# Patient Record
Sex: FEMALE | Race: WHITE | NOT HISPANIC OR LATINO | Employment: OTHER | URBAN - METROPOLITAN AREA
[De-identification: names, ages, dates, MRNs, and addresses within clinical notes are randomized per-mention and may not be internally consistent; named-entity substitution may affect disease eponyms.]

---

## 2020-04-02 ENCOUNTER — TELEPHONE (OUTPATIENT)
Dept: GASTROENTEROLOGY | Facility: CLINIC | Age: 70
End: 2020-04-02

## 2020-04-02 ENCOUNTER — TELEMEDICINE (OUTPATIENT)
Dept: GASTROENTEROLOGY | Facility: CLINIC | Age: 70
End: 2020-04-02

## 2020-04-02 VITALS — BODY MASS INDEX: 42.33 KG/M2 | WEIGHT: 230 LBS | HEIGHT: 62 IN

## 2020-04-02 DIAGNOSIS — Z91.19 FAILURE TO ATTEND APPOINTMENT WITHOUT REASON GIVEN: Primary | ICD-10-CM

## 2020-04-02 DIAGNOSIS — K57.32 DIVERTICULITIS LARGE INTESTINE W/O PERFORATION OR ABSCESS W/O BLEEDING: Primary | ICD-10-CM

## 2020-04-03 RX ORDER — AMOXICILLIN AND CLAVULANATE POTASSIUM 875; 125 MG/1; MG/1
1 TABLET, FILM COATED ORAL EVERY 12 HOURS SCHEDULED
Qty: 20 TABLET | Refills: 0 | Status: SHIPPED | OUTPATIENT
Start: 2020-04-03 | End: 2020-04-13

## 2020-04-06 ENCOUNTER — OFFICE VISIT (OUTPATIENT)
Dept: GASTROENTEROLOGY | Facility: CLINIC | Age: 70
End: 2020-04-06
Payer: MEDICARE

## 2020-04-06 VITALS
SYSTOLIC BLOOD PRESSURE: 122 MMHG | BODY MASS INDEX: 43.24 KG/M2 | DIASTOLIC BLOOD PRESSURE: 60 MMHG | WEIGHT: 235 LBS | HEART RATE: 91 BPM | HEIGHT: 62 IN

## 2020-04-06 DIAGNOSIS — Z12.11 SCREENING FOR COLON CANCER: ICD-10-CM

## 2020-04-06 DIAGNOSIS — K57.92 DIVERTICULITIS: Primary | ICD-10-CM

## 2020-04-06 DIAGNOSIS — K62.5 RECTAL BLEED: ICD-10-CM

## 2020-04-06 PROCEDURE — 99204 OFFICE O/P NEW MOD 45 MIN: CPT | Performed by: INTERNAL MEDICINE

## 2021-03-09 DIAGNOSIS — Z23 ENCOUNTER FOR IMMUNIZATION: ICD-10-CM

## 2021-03-17 ENCOUNTER — IMMUNIZATIONS (OUTPATIENT)
Dept: FAMILY MEDICINE CLINIC | Facility: HOSPITAL | Age: 71
End: 2021-03-17

## 2021-03-17 DIAGNOSIS — Z23 ENCOUNTER FOR IMMUNIZATION: Primary | ICD-10-CM

## 2021-03-17 PROCEDURE — 91300 SARS-COV-2 / COVID-19 MRNA VACCINE (PFIZER-BIONTECH) 30 MCG: CPT

## 2021-03-17 PROCEDURE — 0001A SARS-COV-2 / COVID-19 MRNA VACCINE (PFIZER-BIONTECH) 30 MCG: CPT

## 2021-04-10 ENCOUNTER — IMMUNIZATIONS (OUTPATIENT)
Dept: FAMILY MEDICINE CLINIC | Facility: HOSPITAL | Age: 71
End: 2021-04-10

## 2021-04-10 DIAGNOSIS — Z23 ENCOUNTER FOR IMMUNIZATION: Primary | ICD-10-CM

## 2021-04-10 PROCEDURE — 0002A SARS-COV-2 / COVID-19 MRNA VACCINE (PFIZER-BIONTECH) 30 MCG: CPT

## 2021-04-10 PROCEDURE — 91300 SARS-COV-2 / COVID-19 MRNA VACCINE (PFIZER-BIONTECH) 30 MCG: CPT

## 2023-06-14 ENCOUNTER — EVALUATION (OUTPATIENT)
Dept: PHYSICAL THERAPY | Facility: CLINIC | Age: 73
End: 2023-06-14
Payer: MEDICARE

## 2023-06-14 DIAGNOSIS — M17.0 BILATERAL PRIMARY OSTEOARTHRITIS OF KNEE: Primary | ICD-10-CM

## 2023-06-14 PROCEDURE — 97110 THERAPEUTIC EXERCISES: CPT | Performed by: PHYSICAL THERAPIST

## 2023-06-14 PROCEDURE — 97162 PT EVAL MOD COMPLEX 30 MIN: CPT | Performed by: PHYSICAL THERAPIST

## 2023-06-14 RX ORDER — MELOXICAM 15 MG/1
15 TABLET ORAL DAILY
COMMUNITY

## 2023-06-14 RX ORDER — PRAVASTATIN SODIUM 20 MG
20 TABLET ORAL DAILY
COMMUNITY

## 2023-06-14 NOTE — LETTER
2023    Maurice Lara DO  [address]  [address]    Patient: Jaziel Thurston   YOB: 1950   Date of Visit: 2023     Encounter Diagnosis     ICD-10-CM    1  Bilateral primary osteoarthritis of knee  M17 0           Dear Dr Michaelle Medellin: Thank you for your recent referral of Jaziel Thurston  Please review the attached evaluation summary from Charis's recent visit  Please verify that you agree with the plan of care by signing the attached order  If you have any questions or concerns, please do not hesitate to call  I sincerely appreciate the opportunity to share in the care of one of your patients and hope to have another opportunity to work with you in the near future  Sincerely,    Yaneli Lieberman, PT      Referring Provider:      I certify that I have read the below Plan of Care and certify the need for these services furnished under this plan of treatment while under my care  Maurice Lara DO  [address]  [address]  [address] [address]  Via Fax: [phone]          PT Evaluation     Today's date: 2023  Patient name: Jaziel Thurston  : 1950  MRN: [id number]  Referring provider: Frannie Balderrama DO  Dx:   Encounter Diagnosis     ICD-10-CM    1  Bilateral primary osteoarthritis of knee  M17 0                      Assessment  Assessment details: Jaziel Thurston is a 67 y o  female presenting to outpatient physical therapy with chief complaints of (B) knee pain  Patient describes chronic knee pain for many years that has gradually progressed - injections recently were not effective  Patient displays with abnormal gait, good knee ROM, good knee strength, (B) hip strength deficits, (-) ligamentous instability, and functional restrictions    Patient's symptoms are multifactorial in nature with a primary movement diagnosis of poor hip motor control resulting in pathoanatomical signs and symptoms consistent with Bilateral primary osteoarthritis of knee  (primary encounter diagnosis) resulting in limitations in her ability to walk and exercise as desired  No further referral appears necessary at this time based upon examination results - patient consulting with orthopedic surgeon on 6/27/23  Please contact me if you have any questions ([phone])  Thank you for the opportunity to share in the care of this patient  Impairments: abnormal gait, activity intolerance, impaired balance, impaired physical strength, lacks appropriate home exercise program and pain with function    Symptom irritability: moderateUnderstanding of Dx/Px/POC: good   Prognosis: fair  Prognosis details: Positive prognostic indicators include positive attitude toward recovery, motivated to improve, good understanding of condition, realistic expectations  Negative prognostic indicators include chronicity of symptoms  Goals  STG to be met in 3 weeks:  - Increase (B) Hip strength by 1/2 MMT grade  - Improve SL balance to 15 seconds  - I with HEP   - Patient will be able to walk short distances without an AD and without pain  LTG to be met in 6 weeks:  - Increase (B) Hip strength to 4+/5 all planes  - Improve SL balance to 30 seconds  - I with updated HEP   - Patient will be able to return to ascending and descending steps without pain or difficulty  Plan  Plan details: Prognosis above is given PT services 2x/week tapering to 1x/week over the next 6 weeks and home program adherence       Patient would benefit from: skilled physical therapy  Planned modality interventions: cryotherapy and thermotherapy: hydrocollator packs  Planned therapy interventions: joint mobilization, manual therapy, neuromuscular re-education, patient education, strengthening, stretching, therapeutic activities, therapeutic exercise, home exercise program, body mechanics training, activity modification, functional ROM exercises, gait training and balance  Plan of Care beginning date: 2023  Plan of Care expiration date: 2023  Treatment plan discussed with: patient        Subjective Evaluation    History of Present Illness  Mechanism of injury: At Evaluation (2023): Patient reports chronic (B) knee pain for over 30 years  She has managed her symptoms over the years with activity modification, injections, and OTC medication  She reports the last time she had cortisone injections they were not as helpful  She saw the orthopedic physician recently - recommended consulting with a surgeon  Red Flag Screen:  Patient denies recent fever, changes in bowel and bladder function, nausea and vomiting, weakness, unexplained weight loss, tingling, numbness, pain with coughing, or traumatic SYLWIA       Greatest concern: walking well after surgery    Quality of life: good    Pain  Current pain ratin  At best pain ratin  At worst pain ratin  Location: (B) Knees  Quality: dull ache    Social Support  Steps to enter house: yes  Stairs in house: yes   Lives in: multiple-level home  Lives with: spouse    Employment status: not working    Diagnostic Tests  X-ray: abnormal  Patient Goals  Patient goal: Patient Specific Functional Scale: walk without support and no increased pain 0/10, perform stair negotiation without increased pain 0/10, prepare for surgery if needed 0/10; Total 0/30        Objective     Static Posture   General Observations  Symmetrical weight bearing       Palpation     Additional Palpation Details  No TTP throughout (B) knees     Active Range of Motion   Left Knee   Flexion: 130 degrees   Extension: 0 degrees     Right Knee   Flexion: 130 degrees   Extension: 0 degrees     Additional Active Range of Motion Details  Increased pain with (B) flexion OP     Strength/Myotome Testing     Left Hip   Planes of Motion   Extension: 3+  Abduction: 3+    Right Hip   Planes of Motion   Extension: 3+  Abduction: 3+    Left Knee   Flexion: 4+  Extension: 4+    Right Knee Flexion: 4+  Extension: 4+    Tests     Left Knee   Negative anterior drawer, posterior drawer, valgus stress test at 0 degrees, valgus stress test at 30 degrees, varus stress test at 0 degrees and varus stress test at 30 degrees  Right Knee   Negative anterior drawer, posterior drawer, valgus stress test at 0 degrees, valgus stress test at 30 degrees, varus stress test at 0 degrees and varus stress test at 30 degrees  Ambulation     Observational Gait   Gait: antalgic   Decreased walking speed, left step length and right step length  Daily Treatment Diary     DX: (B) Knee OA  EPOC: 7/26/23  Follow Up with Referring Provider:   Precautions: NA  CO-MORBIDITIES: Hx of AAA  HEP ACCESS CODE: [id number]   Survey Monkey Given (______)    Stage: chronic  Stability of Symptoms:  At Evaluation: stable - unchanged  Global Rating of Change:   Symptom Irritability Level: moderate  Primary Movement Diagnosis: poor hip motor control   Patient Specific Functional Scale:   6/14/23: walk without support and no increased pain 0/10, perform stair negotiation without increased pain 0/10, prepare for surgery if needed 0/10;  Total 0/30  FOTO Prediction: 66 by visit 11  FOTO Progress: 60 at evaluation   Greatest Concern: walking well after surgery  Current Activity Plan: added to HEP (6/13)  Current Educational Needs: progressions       Treatment Diary          Manual Therapy                                                                        Therapeutic Exercise  HEP         Recumbent Bike                    SLR 6/13         S/L Hip ABD 6/13         Bridge 6/13                                                                     Neuromuscular Reeducation          TB Counter Balance                    TB Side Stepping          TB W Stepping                    FWD Lunge          LAT Lunge          Mini Squat                    FWD Step Up          LAT Step Up          Therapeutic Activity Gait Training                                        Modalities

## 2023-06-14 NOTE — PROGRESS NOTES
PT Evaluation     Today's date: 2023  Patient name: Abdoulaye Terrell  : 1950  MRN: [id number]  Referring provider: Maykel Larry DO  Dx:   Encounter Diagnosis     ICD-10-CM    1  Bilateral primary osteoarthritis of knee  M17 0                      Assessment  Assessment details: Abdoulaye Terrell is a 67 y o  female presenting to outpatient physical therapy with chief complaints of (B) knee pain  Patient describes chronic knee pain for many years that has gradually progressed - injections recently were not effective  Patient displays with abnormal gait, good knee ROM, good knee strength, (B) hip strength deficits, (-) ligamentous instability, and functional restrictions  Patient's symptoms are multifactorial in nature with a primary movement diagnosis of poor hip motor control resulting in pathoanatomical signs and symptoms consistent with Bilateral primary osteoarthritis of knee  (primary encounter diagnosis) resulting in limitations in her ability to walk and exercise as desired  No further referral appears necessary at this time based upon examination results - patient consulting with orthopedic surgeon on 23  Please contact me if you have any questions ([phone])  Thank you for the opportunity to share in the care of this patient  Impairments: abnormal gait, activity intolerance, impaired balance, impaired physical strength, lacks appropriate home exercise program and pain with function    Symptom irritability: moderateUnderstanding of Dx/Px/POC: good   Prognosis: fair  Prognosis details: Positive prognostic indicators include positive attitude toward recovery, motivated to improve, good understanding of condition, realistic expectations  Negative prognostic indicators include chronicity of symptoms  Goals  STG to be met in 3 weeks:  - Increase (B) Hip strength by 1/2 MMT grade  - Improve SL balance to 15 seconds    - I with HEP   - Patient will be able to walk short distances without an AD and without pain  LTG to be met in 6 weeks:  - Increase (B) Hip strength to 4+/5 all planes  - Improve SL balance to 30 seconds  - I with updated HEP   - Patient will be able to return to ascending and descending steps without pain or difficulty  Plan  Plan details: Prognosis above is given PT services 2x/week tapering to 1x/week over the next 6 weeks and home program adherence  Patient would benefit from: skilled physical therapy  Planned modality interventions: cryotherapy and thermotherapy: hydrocollator packs  Planned therapy interventions: joint mobilization, manual therapy, neuromuscular re-education, patient education, strengthening, stretching, therapeutic activities, therapeutic exercise, home exercise program, body mechanics training, activity modification, functional ROM exercises, gait training and balance  Plan of Care beginning date: 2023  Plan of Care expiration date: 2023  Treatment plan discussed with: patient        Subjective Evaluation    History of Present Illness  Mechanism of injury: At Evaluation (2023): Patient reports chronic (B) knee pain for over 30 years  She has managed her symptoms over the years with activity modification, injections, and OTC medication  She reports the last time she had cortisone injections they were not as helpful  She saw the orthopedic physician recently - recommended consulting with a surgeon        Red Flag Screen:  Patient denies recent fever, changes in bowel and bladder function, nausea and vomiting, weakness, unexplained weight loss, tingling, numbness, pain with coughing, or traumatic SYLWIA       Greatest concern: walking well after surgery    Quality of life: good    Pain  Current pain ratin  At best pain ratin  At worst pain ratin  Location: (B) Knees  Quality: dull ache    Social Support  Steps to enter house: yes  Stairs in house: yes   Lives in: multiple-level home  Lives with: spouse    Employment status: not working    Diagnostic Tests  X-ray: abnormal  Patient Goals  Patient goal: Patient Specific Functional Scale: walk without support and no increased pain 0/10, perform stair negotiation without increased pain 0/10, prepare for surgery if needed 0/10; Total 0/30        Objective     Static Posture   General Observations  Symmetrical weight bearing  Palpation     Additional Palpation Details  No TTP throughout (B) knees     Active Range of Motion   Left Knee   Flexion: 130 degrees   Extension: 0 degrees     Right Knee   Flexion: 130 degrees   Extension: 0 degrees     Additional Active Range of Motion Details  Increased pain with (B) flexion OP     Strength/Myotome Testing     Left Hip   Planes of Motion   Extension: 3+  Abduction: 3+    Right Hip   Planes of Motion   Extension: 3+  Abduction: 3+    Left Knee   Flexion: 4+  Extension: 4+    Right Knee   Flexion: 4+  Extension: 4+    Tests     Left Knee   Negative anterior drawer, posterior drawer, valgus stress test at 0 degrees, valgus stress test at 30 degrees, varus stress test at 0 degrees and varus stress test at 30 degrees  Right Knee   Negative anterior drawer, posterior drawer, valgus stress test at 0 degrees, valgus stress test at 30 degrees, varus stress test at 0 degrees and varus stress test at 30 degrees  Ambulation     Observational Gait   Gait: antalgic   Decreased walking speed, left step length and right step length                Daily Treatment Diary     DX: (B) Knee OA  EPOC: 7/26/23  Follow Up with Referring Provider:   Precautions: NA  CO-MORBIDITIES: Hx of AAA  HEP ACCESS CODE: [id number]   Survey Monkey Given (______)    Stage: chronic  Stability of Symptoms:  At Evaluation: stable - unchanged  Global Rating of Change:   Symptom Irritability Level: moderate  Primary Movement Diagnosis: poor hip motor control   Patient Specific Functional Scale:   6/14/23: walk without support and no increased pain 0/10, perform stair negotiation without increased pain 0/10, prepare for surgery if needed 0/10;  Total 0/30  FOTO Prediction: 66 by visit 11  FOTO Progress: 60 at evaluation   Greatest Concern: walking well after surgery  Current Activity Plan: added to HEP (6/13)  Current Educational Needs: progressions       Treatment Diary          Manual Therapy                                                                        Therapeutic Exercise  HEP         Recumbent Bike                    SLR 6/13         S/L Hip ABD 6/13         Bridge 6/13                                                                     Neuromuscular Reeducation          TB Counter Balance                    TB Side Stepping          TB W Stepping                    FWD Lunge          LAT Lunge          Mini Squat                    FWD Step Up          LAT Step Up          Therapeutic Activity                              Gait Training                                        Modalities

## 2023-06-22 ENCOUNTER — OFFICE VISIT (OUTPATIENT)
Dept: PHYSICAL THERAPY | Facility: CLINIC | Age: 73
End: 2023-06-22
Payer: MEDICARE

## 2023-06-22 DIAGNOSIS — M17.0 BILATERAL PRIMARY OSTEOARTHRITIS OF KNEE: Primary | ICD-10-CM

## 2023-06-22 PROCEDURE — 97110 THERAPEUTIC EXERCISES: CPT | Performed by: PHYSICAL THERAPIST

## 2023-06-22 PROCEDURE — 97112 NEUROMUSCULAR REEDUCATION: CPT | Performed by: PHYSICAL THERAPIST

## 2023-06-22 NOTE — PROGRESS NOTES
Daily Note     Today's date: 2023  Patient name: Damien Plummer  : 1950  MRN: [id number]  Referring provider: Gideon Valverde DO  Dx:   Encounter Diagnosis     ICD-10-CM    1  Bilateral primary osteoarthritis of knee  M17 0                      Subjective: Patient reports good tolerance to her LV  She saw an orthopedic surgeon - scheduled for surgery in October  She notes HEP is going well - challenging to do 2 sets of 10  Objective: See treatment diary below      Assessment:   Stage: chronic  Stability of Symptoms:  At Evaluation: stable - unchanged  Global Rating of Change:   Symptom Irritability Level: moderate  Primary Movement Diagnosis: poor hip motor control   Patient Specific Functional Scale:   23: walk without support and no increased pain 0/10, perform stair negotiation without increased pain 0/10, prepare for surgery if needed 0/10; Total 0/30  FOTO Prediction: 66 by visit 11  FOTO Progress: 60 at evaluation   Greatest Concern: walking well after surgery  Current Activity Plan: added to HEP ()  Current Educational Needs: progressions     Patient had good tolerance to previously issued HEP - discussed strategies to avoid cramping in hamstring  She was challenged with standing exercises - no complaints of increased pain  Skilled PT continues to be required to guide progression of hip strength and control to allow her to walk with better support and less pain and prepare for surgery in October          Plan: Continue with POC - monitor tolerance to treatment - progress as able NV       Daily Treatment Diary     DX: (B) Knee OA  EPOC: 23  Follow Up with Referring Provider:   Precautions: NA  CO-MORBIDITIES: Hx of AAA  HEP ACCESS CODE: [id number]   Survey Monkey Given (______)    Treatment Diary          Manual Therapy                                                                        Therapeutic Exercise  HEP         Recumbent Bike  6 min                  SLR  2x10 "ea        S/L Hip ABD 6/13 2x10 ea        Bridge 6/13 5\"x20                                                                    Neuromuscular Reeducation          TB Counter Balance  GTB  10x ea                  TB Side Stepping          TB W Stepping                    FWD Lunge  10x ea        LAT Lunge  10x ea        Mini Squat  10x                  FWD Step Up  8\" Step  10x ea        LAT Step Up          Therapeutic Activity                              Gait Training                                        Modalities                                        "

## 2023-06-26 ENCOUNTER — APPOINTMENT (OUTPATIENT)
Dept: PHYSICAL THERAPY | Facility: CLINIC | Age: 73
End: 2023-06-26
Payer: MEDICARE

## 2023-06-29 ENCOUNTER — APPOINTMENT (OUTPATIENT)
Dept: PHYSICAL THERAPY | Facility: CLINIC | Age: 73
End: 2023-06-29
Payer: MEDICARE

## 2023-06-29 ENCOUNTER — OFFICE VISIT (OUTPATIENT)
Dept: PHYSICAL THERAPY | Facility: CLINIC | Age: 73
End: 2023-06-29
Payer: MEDICARE

## 2023-06-29 DIAGNOSIS — M17.0 BILATERAL PRIMARY OSTEOARTHRITIS OF KNEE: Primary | ICD-10-CM

## 2023-06-29 PROCEDURE — 97112 NEUROMUSCULAR REEDUCATION: CPT | Performed by: PHYSICAL THERAPIST

## 2023-06-29 PROCEDURE — 97110 THERAPEUTIC EXERCISES: CPT | Performed by: PHYSICAL THERAPIST

## 2023-06-29 NOTE — PROGRESS NOTES
Daily Note     Today's date: 2023  Patient name: True Vogel  : 1950  MRN: [id number]  Referring provider: Cami Rodríguez DO  Dx:   Encounter Diagnosis     ICD-10-CM    1  Bilateral primary osteoarthritis of knee  M17 0                      Subjective: Patient reports she had some chest pain last week - was seen in the ER - cleared for MI  She has been taking it easy with her HEP  Objective: See treatment diary below      Assessment:   Stage: chronic  Stability of Symptoms:  At Evaluation: stable - unchanged  Global Rating of Change:   Symptom Irritability Level: moderate  Primary Movement Diagnosis: poor hip motor control   Patient Specific Functional Scale:   23: walk without support and no increased pain 0/10, perform stair negotiation without increased pain 0/10, prepare for surgery if needed 0/10; Total 030  FOTO Prediction: 66 by visit 11  FOTO Progress: 60 at evaluation   Greatest Concern: walking well after surgery  Current Activity Plan: added to HEP ()  Current Educational Needs: progressions     Patient had good tolerance to exercise treatment  Progressions were not made secondary to recent ER visit  She had no reproduction of pain or chest tightness with exercise  Skilled PT continues to be required to guide progression of hip strength and control to allow her to walk with better support and less pain and prepare for surgery in October          Plan: Continue with POC - monitor tolerance to treatment - progress as able NV       Daily Treatment Diary     DX: (B) Knee OA  EPOC: 23  Follow Up with Referring Provider:   Precautions: NA  CO-MORBIDITIES: Hx of AAA  HEP ACCESS CODE: [id number]   Survey Monkey Given (______)    Treatment Diary         Manual Therapy                                                                        Therapeutic Exercise  HEP         Recumbent Bike  6 min 6 min                 SLR  2x10 ea 20x ea       S/L Hip ABD  2x10 ea "2x10 ea       Bridge 6/13 5\"x20 5\"x20                                                                   Neuromuscular Reeducation          TB Counter Balance  GTB  10x ea GTB  10x ea                 TB Side Stepping          TB W Stepping                    FWD Lunge  10x ea 10x ea       LAT Lunge  10x ea 10x ea       Mini Squat  10x 10x                 FWD Step Up  8\" Step  10x ea 8\" Step  10x ea       LAT Step Up          Therapeutic Activity                              Gait Training                                        Modalities                                        "

## 2023-07-06 ENCOUNTER — APPOINTMENT (OUTPATIENT)
Dept: PHYSICAL THERAPY | Facility: CLINIC | Age: 73
End: 2023-07-06
Payer: MEDICARE

## 2023-07-13 ENCOUNTER — OFFICE VISIT (OUTPATIENT)
Dept: PHYSICAL THERAPY | Facility: CLINIC | Age: 73
End: 2023-07-13
Payer: MEDICARE

## 2023-07-13 DIAGNOSIS — M17.0 BILATERAL PRIMARY OSTEOARTHRITIS OF KNEE: Primary | ICD-10-CM

## 2023-07-13 PROCEDURE — 97110 THERAPEUTIC EXERCISES: CPT | Performed by: PHYSICAL THERAPIST

## 2023-07-13 PROCEDURE — 97112 NEUROMUSCULAR REEDUCATION: CPT | Performed by: PHYSICAL THERAPIST

## 2023-07-13 NOTE — PROGRESS NOTES
Daily Note     Today's date: 2023  Patient name: Meli Naylor  : 1950  MRN: [id number]  Referring provider: Russell Patel DO  Dx:   Encounter Diagnosis     ICD-10-CM    1. Bilateral primary osteoarthritis of knee  M17.0                      Subjective: Patient reports she is feeling better overall - feels confident in her ability to continue progressing on her own. Objective: See treatment diary below      Assessment:   Stage: chronic  Stability of Symptoms:  At Evaluation: stable - unchanged  Global Rating of Change:   Symptom Irritability Level: moderate  Primary Movement Diagnosis: poor hip motor control   Patient Specific Functional Scale:   23: walk without support and no increased pain 0/10, perform stair negotiation without increased pain 0/10, prepare for surgery if needed 0/10; Total 030  FOTO Prediction: 66 by visit 11  FOTO Progress: 60 at evaluation   Greatest Concern: walking well after surgery  Current Activity Plan: added to HEP ()  Current Educational Needs: progressions     Patient had good exercise form and recall. She feels comfortable progressing her exercises independently to prepare for surgery. At this time it is recommended that she continue with an I HEP. She is to contact me if she has return of symptoms or any questions/ concerns.           Plan: DC to I HEP       Daily Treatment Diary     DX: (B) Knee OA  EPOC: 23  Follow Up with Referring Provider:   Precautions: NA  CO-MORBIDITIES: Hx of AAA  HEP ACCESS CODE: [id number]   Survey Monkey Given (______)    Treatment Diary        Manual Therapy                                                                        Therapeutic Exercise  HEP         Recumbent Bike  6 min 6 min 6 min                SLR  2x10 ea 20x ea 20x ea      S/L Hip ABD  2x10 ea 2x10 ea 20x ea      Bridge  5"x20 5"x20 5"x20                                                                  Neuromuscular Reeducation          TB Counter Balance  GTB  10x ea GTB  10x ea                 TB Side Stepping          TB W Stepping                    FWD Lunge  10x ea 10x ea 15x ea      LAT Lunge  10x ea 10x ea 15x ea      Mini Squat  10x 10x 15x                FWD Step Up  8" Step  10x ea 8" Step  10x ea 8" Step  10x ea      LAT Step Up          Therapeutic Activity                              Gait Training                                        Modalities

## 2023-10-26 ENCOUNTER — EVALUATION (OUTPATIENT)
Dept: PHYSICAL THERAPY | Facility: CLINIC | Age: 73
End: 2023-10-26
Payer: MEDICARE

## 2023-10-26 DIAGNOSIS — Z96.651 S/P TKR (TOTAL KNEE REPLACEMENT), RIGHT: Primary | ICD-10-CM

## 2023-10-26 PROCEDURE — 97110 THERAPEUTIC EXERCISES: CPT | Performed by: PHYSICAL THERAPIST

## 2023-10-26 PROCEDURE — 97162 PT EVAL MOD COMPLEX 30 MIN: CPT | Performed by: PHYSICAL THERAPIST

## 2023-10-26 RX ORDER — HYDROCODONE BITARTRATE AND ACETAMINOPHEN 5; 325 MG/1; MG/1
1 TABLET ORAL EVERY 6 HOURS PRN
COMMUNITY

## 2023-10-26 RX ORDER — PANTOPRAZOLE SODIUM 40 MG/1
40 TABLET, DELAYED RELEASE ORAL DAILY
COMMUNITY

## 2023-10-26 RX ORDER — OXYCODONE HYDROCHLORIDE 5 MG/1
5 CAPSULE ORAL EVERY 4 HOURS PRN
COMMUNITY

## 2023-10-26 NOTE — PROGRESS NOTES
PT Evaluation     Today's date: 10/26/2023  Patient name: Hayley Arellano  : 1950  MRN: [id number]  Referring provider: Jacqueline Villafuerte DO  Dx:   Encounter Diagnosis     ICD-10-CM    1. S/P TKR (total knee replacement), right  Z96.651                      Assessment  Assessment details: Hayley Arellano is a 68 y.o. female presenting to outpatient physical therapy s/p (R) TKA. Patient describes chronic (B) knee pain leading up to (R) TKA. Patient displays with abnormal gait, abnormal posture, restricted (R) knee AROM, (B) hip and (R) knee strength deficits, balance impairments, and functional restrictions. Patient's symptoms are multifactorial in nature with a primary movement diagnosis of (R) knee hypomobility resulting in pathoanatomical signs and symptoms consistent with S/P TKR (total knee replacement), right  (primary encounter diagnosis) resulting in limitations in her ability to perform all housework without pain. No further referral appears necessary at this time based upon examination results. Please contact me if you have any questions ([phone]). Thank you for the opportunity to share in the care of this patient. Impairments: abnormal gait, abnormal or restricted ROM, activity intolerance, impaired balance, impaired physical strength, lacks appropriate home exercise program and pain with function    Symptom irritability: moderateUnderstanding of Dx/Px/POC: good   Prognosis: good  Prognosis details: Positive prognostic indicators include positive attitude toward recovery, motivated to improve, high self-efficacy, good understanding of condition, realistic expectations. Negative prognostic indicators include chronicity of symptoms. Goals  STG to be met in 5 weeks:  - Increase (R) Knee AROM: 0-105 degrees. - Increase (B) Hip and (R) Knee strength by 1/2 MMT grade. - Improve SL balance to 10 seconds. - I with HEP.  - Patient will be able to return to walking without AD.    LTG to be met in 10 weeks:  - Increase (R) Knee AROM: 0-120 degrees. - Increase (B) Hip and (R) Knee strength to 4+/5 all planes. - Improve SL balance to 20 seconds. - I with updated HEP.  - Patient will be able to return to performing all housework without increased pain. Plan  Plan details: Prognosis above is given PT services 2x/week tapering to 1x/week over the next 10 weeks and home program adherence. Patient would benefit from: skilled physical therapy  Planned modality interventions: cryotherapy and thermotherapy: hydrocollator packs  Planned therapy interventions: joint mobilization, manual therapy, neuromuscular re-education, patient education, strengthening, stretching, therapeutic activities, therapeutic exercise, home exercise program, body mechanics training, activity modification, functional ROM exercises, gait training and balance  Plan of Care beginning date: 10/26/2023  Plan of Care expiration date: 2024  Treatment plan discussed with: patient        Subjective Evaluation    History of Present Illness  Date of surgery: 10/23/2023  Mechanism of injury: surgery  Mechanism of injury: At Evaluation (2023): Patient reports chronic (B) knee pain for many years. She underwent (R) TKA on 10/23/23 and was discharged to home the same day. Red Flag Screen:  Patient denies recent fever, changes in bowel and bladder function, nausea and vomiting, weakness, unexplained weight loss, tingling, numbness, pain with coughing, or traumatic SYLWIA. Laura Schooner' Criteria for DVT Score = +1. Greatest concern: Swelling     Quality of life: good    Patient Goals  Patient goal: Patient Specific Functional Scale: return to walking without AD 0/10, perform stair negotiation without pain 0/10, return to performing all housework without pain 0/10;  Total 0/30  Pain  Current pain ratin  At best pain rating: 3  At worst pain rating: 10  Location: (R) Knee  Quality: dull ache and pressure  Alleviating factors: Active modification, gentle movement, medication. Exacerbated by: Standing, thigh pain with elevation, end range flexion, end range extension. Social Support  Steps to enter house: yes  Stairs in house: yes   Lives in: multiple-level home  Lives with: spouse    Employment status: not working    Diagnostic Tests  X-ray: abnormal  Treatments  Previous treatment: physical therapy        Objective     Static Posture   General Observations  Asymmetrical weight bearing and shifted left. Knee   Knee (Right): Flexed. Observations     Additional Observation Details  No evidence of infection    Palpation     Additional Palpation Details  TTP throughout (R) knee     Active Range of Motion     Right Knee   Flexion: 60 degrees   Extension: -5 degrees     Strength/Myotome Testing     Left Hip   Planes of Motion   Extension: 5  Abduction: 4+    Right Hip   Planes of Motion   Extension: 3+  Abduction: 3+    Left Knee   Flexion: 4  Extension: 3    Right Knee   Flexion: 5  Extension: 4+    Additional Strength Details  Hip ABD MMT performed in hooklying  Hip Ext MMT performed in supine    Tests     Additional Tests Details  Additional special testing not performed secondary to post op status        Functional Assessment        Comments  TU.6 seconds             Daily Treatment Diary     DX: (R) TKA  EPOC: 24  Follow Up with Referring Provider:   Precautions: NA  CO-MORBIDITIES: Hx of AAA  HEP ACCESS CODE: [id number]      Stage: acute on chronic  Stability of Symptoms:  At Evaluation: evolving - worsening  Global Rating of Change:   Symptom Irritability Level: high  Primary Movement Diagnosis: knee hypomobility  Primary Pain Phenotype: nociceptive   Patient Specific Functional Scale:   10/26/23: return to walking without AD 0/10, perform stair negotiation without pain 0/10, return to performing all housework without pain 0/10;  Total 0/30   FOTO Prediction: 58 by visit 18  FOTO Progress: 31 at evaluation   Greatest Concern: swelling  Current Activity Plan: added to HEP (10/26)  Current Educational Needs: progressions      Treatment Diary          Manual Therapy         (R) Knee PROM                                                               Therapeutic Exercise  HEP         Recumbent Bike          Ankle Pump 10/26         Supine Quad Set 10/26         Supine Heel Slide          Seated Quad Set 10/26         Seated Heel Slide 10/26         Long Sit Calf Stretch 10/26                             SLR          H/L TB Hip ABD          Bridge                    LAQ                    Neuromuscular Reeducation          SL Balance                    Standing Hip Flex          Standing Hip ABD          Standing Hip Ext          Standing HS Curl          TB TKE                    FWD Wt Shift          LAT Wt Shift          Mini Squat                    FWD Step Up          LAT Step Up                    Therapeutic Activity                              Gait Training                                        Modalities

## 2023-10-26 NOTE — LETTER
2023    Yaneth Downey DO  [address]    Patient: Boogie Brown   YOB: 1950   Date of Visit: 10/26/2023     Encounter Diagnosis     ICD-10-CM    1. S/P TKR (total knee replacement), right  Z96.651           Dear Dr. Luigi Kevin: Thank you for your recent referral of Boogie Brown. Please review the attached evaluation summary from Charis's recent visit. Please verify that you agree with the plan of care by signing the attached order. If you have any questions or concerns, please do not hesitate to call. I sincerely appreciate the opportunity to share in the care of one of your patients and hope to have another opportunity to work with you in the near future. Sincerely,    Mikayla Gama, PT      Referring Provider:      I certify that I have read the below Plan of Care and certify the need for these services furnished under this plan of treatment while under my care. Yaneth Downey DO  [address] [place] [id number]  Via Fax: [phone]          PT Evaluation     Today's date: 10/26/2023  Patient name: Boogie Brown  : 1950  MRN: [id number]  Referring provider: Yaneth Downey DO  Dx:   Encounter Diagnosis     ICD-10-CM    1. S/P TKR (total knee replacement), right  Z96.651                      Assessment  Assessment details: Boogie Brown is a 68 y.o. female presenting to outpatient physical therapy s/p (R) TKA. Patient describes chronic (B) knee pain leading up to (R) TKA. Patient displays with abnormal gait, abnormal posture, restricted (R) knee AROM, (B) hip and (R) knee strength deficits, balance impairments, and functional restrictions.   Patient's symptoms are multifactorial in nature with a primary movement diagnosis of (R) knee hypomobility resulting in pathoanatomical signs and symptoms consistent with S/P TKR (total knee replacement), right  (primary encounter diagnosis) resulting in limitations in her ability to perform all housework without pain. No further referral appears necessary at this time based upon examination results. Please contact me if you have any questions ([phone]). Thank you for the opportunity to share in the care of this patient. Impairments: abnormal gait, abnormal or restricted ROM, activity intolerance, impaired balance, impaired physical strength, lacks appropriate home exercise program and pain with function    Symptom irritability: moderateUnderstanding of Dx/Px/POC: good   Prognosis: good  Prognosis details: Positive prognostic indicators include positive attitude toward recovery, motivated to improve, high self-efficacy, good understanding of condition, realistic expectations. Negative prognostic indicators include chronicity of symptoms. Goals  STG to be met in 5 weeks:  - Increase (R) Knee AROM: 0-105 degrees. - Increase (B) Hip and (R) Knee strength by 1/2 MMT grade. - Improve SL balance to 10 seconds. - I with HEP.  - Patient will be able to return to walking without AD. LTG to be met in 10 weeks:  - Increase (R) Knee AROM: 0-120 degrees. - Increase (B) Hip and (R) Knee strength to 4+/5 all planes. - Improve SL balance to 20 seconds. - I with updated HEP.  - Patient will be able to return to performing all housework without increased pain. Plan  Plan details: Prognosis above is given PT services 2x/week tapering to 1x/week over the next 10 weeks and home program adherence.      Patient would benefit from: skilled physical therapy  Planned modality interventions: cryotherapy and thermotherapy: hydrocollator packs  Planned therapy interventions: joint mobilization, manual therapy, neuromuscular re-education, patient education, strengthening, stretching, therapeutic activities, therapeutic exercise, home exercise program, body mechanics training, activity modification, functional ROM exercises, gait training and balance  Plan of Care beginning date: 10/26/2023  Plan of Care expiration date: 2024  Treatment plan discussed with: patient        Subjective Evaluation    History of Present Illness  Date of surgery: 10/23/2023  Mechanism of injury: surgery  Mechanism of injury: At Evaluation (2023): Patient reports chronic (B) knee pain for many years. She underwent (R) TKA on 10/23/23 and was discharged to home the same day. Red Flag Screen:  Patient denies recent fever, changes in bowel and bladder function, nausea and vomiting, weakness, unexplained weight loss, tingling, numbness, pain with coughing, or traumatic SYLWIA. Cristina Hester' Criteria for DVT Score = +1. Greatest concern: Swelling     Quality of life: good    Patient Goals  Patient goal: Patient Specific Functional Scale: return to walking without AD 0/10, perform stair negotiation without pain 0/10, return to performing all housework without pain 0/10; Total 0/30  Pain  Current pain ratin  At best pain rating: 3  At worst pain rating: 10  Location: (R) Knee  Quality: dull ache and pressure  Alleviating factors: Active modification, gentle movement, medication. Exacerbated by: Standing, thigh pain with elevation, end range flexion, end range extension. Social Support  Steps to enter house: yes  Stairs in house: yes   Lives in: multiple-level home  Lives with: spouse    Employment status: not working    Diagnostic Tests  X-ray: abnormal  Treatments  Previous treatment: physical therapy        Objective     Static Posture   General Observations  Asymmetrical weight bearing and shifted left. Knee   Knee (Right): Flexed.      Observations     Additional Observation Details  No evidence of infection    Palpation     Additional Palpation Details  TTP throughout (R) knee     Active Range of Motion     Right Knee   Flexion: 60 degrees   Extension: -5 degrees     Strength/Myotome Testing     Left Hip   Planes of Motion   Extension: 5  Abduction: 4+    Right Hip   Planes of Motion   Extension: 3+  Abduction: 3+    Left Knee   Flexion: 4  Extension: 3    Right Knee   Flexion: 5  Extension: 4+    Additional Strength Details  Hip ABD MMT performed in hooklying  Hip Ext MMT performed in supine    Tests     Additional Tests Details  Additional special testing not performed secondary to post op status        Functional Assessment        Comments  TU.6 seconds             Daily Treatment Diary     DX: (R) TKA  EPOC: 24  Follow Up with Referring Provider:   Precautions: NA  CO-MORBIDITIES: Hx of AAA  HEP ACCESS CODE: [id number]      Stage: acute on chronic  Stability of Symptoms:  At Evaluation: evolving - worsening  Global Rating of Change:   Symptom Irritability Level: high  Primary Movement Diagnosis: knee hypomobility  Primary Pain Phenotype: nociceptive   Patient Specific Functional Scale:   10/26/23: return to walking without AD 0/10, perform stair negotiation without pain 0/10, return to performing all housework without pain 0/10;  Total 0/30   FOTO Prediction: 58 by visit 18  FOTO Progress: 31 at evaluation   Greatest Concern: swelling  Current Activity Plan: added to HEP (10/26)  Current Educational Needs: progressions      Treatment Diary          Manual Therapy         (R) Knee PROM                                                               Therapeutic Exercise  HEP         Recumbent Bike          Ankle Pump 10/26         Supine Quad Set 10/26         Supine Heel Slide          Seated Quad Set 10/26         Seated Heel Slide 10/26         Long Sit Calf Stretch 10/26                             SLR          H/L TB Hip ABD          Bridge                    LAQ                    Neuromuscular Reeducation          SL Balance                    Standing Hip Flex          Standing Hip ABD          Standing Hip Ext          Standing HS Curl          TB TKE                    FWD Wt Shift          LAT Wt Shift          Mini Squat                    FWD Step Up          LAT Step Up                    Therapeutic Activity                              Gait Training                                        Modalities

## 2023-10-31 ENCOUNTER — OFFICE VISIT (OUTPATIENT)
Dept: PHYSICAL THERAPY | Facility: CLINIC | Age: 73
End: 2023-10-31
Payer: MEDICARE

## 2023-10-31 DIAGNOSIS — Z96.651 S/P TKR (TOTAL KNEE REPLACEMENT), RIGHT: Primary | ICD-10-CM

## 2023-10-31 PROCEDURE — 97112 NEUROMUSCULAR REEDUCATION: CPT | Performed by: PHYSICAL THERAPIST

## 2023-10-31 PROCEDURE — 97110 THERAPEUTIC EXERCISES: CPT | Performed by: PHYSICAL THERAPIST

## 2023-10-31 NOTE — PROGRESS NOTES
Daily Note     Today's date: 10/31/2023  Patient name: Luis Alberto Owen  : 1950  MRN: [id number]  Referring provider: Andreia Fam DO  Dx:   Encounter Diagnosis     ICD-10-CM    1. S/P TKR (total knee replacement), right  Z96.651                      Subjective: Patient reports good tolerance to her IE. She notes going to the ER over the weekend - had not had a bowel movement in 6 days - feeling better now. She reports her HEP is going well. Objective: See treatment diary below      Assessment:   Stage: acute on chronic  Stability of Symptoms:  At Evaluation: evolving - worsening  Global Rating of Change:   Symptom Irritability Level: high  Primary Movement Diagnosis: knee hypomobility  Primary Pain Phenotype: nociceptive   Patient Specific Functional Scale:   10/26/23: return to walking without AD 0/10, perform stair negotiation without pain 0/10, return to performing all housework without pain 0/10; Total 0/30   FOTO Prediction: 58 by visit 18  FOTO Progress: 31 at evaluation   Greatest Concern: swelling  Current Activity Plan: added to HEP (10/26)  Current Educational Needs: progressions    Patient had good tolerance to manual stretching. ROM is improving well. She had good tolerance to gentle strengthening exercises and WB exercises. She noted fatigue but no increased pain post treatment. Skilled PT continues to be required to guide progression of knee mobility and strength to allow her to return to all mobility activities without AD and perform housework without pain.         Plan: Continue with POC - monitor tolerance to treatment - progress as able NV       Daily Treatment Diary     DX: (R) TKA  EPOC: 24  Follow Up with Referring Provider:   Precautions: NA  CO-MORBIDITIES: Hx of AAA  HEP ACCESS CODE: [id number]        Treatment Diary  10/31        Manual Therapy         (R) Knee PROM Seated Flex  6 min  Supine Ext  2 min Therapeutic Exercise  HEP         Recumbent Bike  ROM  6 min        Ankle Pump 10/26         Supine Quad Set 10/26 5"x10        Supine Heel Slide          Seated Quad Set 10/26         Seated Heel Slide 10/26         Long Sit Calf Stretch 10/26                             SLR  AAROM  Mod A  10x        H/L TB Hip ABD  GTB  5"x15        Bridge  10x                  LAQ  10x                  Neuromuscular Reeducation          SL Balance                    Standing Hip Flex  10x ea        Standing Hip ABD  10x ea        Standing Hip Ext  10x ea        Standing HS Curl          TB TKE                    FWD Wt Shift  10x ea        LAT Wt Shift          Mini Squat                    FWD Step Up          LAT Step Up                    Therapeutic Activity                              Gait Training                                        Modalities

## 2023-11-02 ENCOUNTER — OFFICE VISIT (OUTPATIENT)
Dept: PHYSICAL THERAPY | Facility: CLINIC | Age: 73
End: 2023-11-02
Payer: MEDICARE

## 2023-11-02 DIAGNOSIS — Z96.651 S/P TKR (TOTAL KNEE REPLACEMENT), RIGHT: Primary | ICD-10-CM

## 2023-11-02 PROCEDURE — 97112 NEUROMUSCULAR REEDUCATION: CPT | Performed by: PHYSICAL THERAPIST

## 2023-11-02 PROCEDURE — 97110 THERAPEUTIC EXERCISES: CPT | Performed by: PHYSICAL THERAPIST

## 2023-11-02 NOTE — PROGRESS NOTES
Daily Note     Today's date: 2023  Patient name: Feliz Daniels  : 1950  MRN: [id number]  Referring provider: Aleksandra Hamlin DO  Dx:   Encounter Diagnosis     ICD-10-CM    1. S/P TKR (total knee replacement), right  Z96.651                      Subjective: Patient reports she continues to have a lot of swelling but is improving. She noted no adverse reaction to her LV. She is moving around at home better but still has a lot of weakness in the leg. Objective: See treatment diary below      Assessment:   Stage: acute on chronic  Stability of Symptoms:  At Evaluation: evolving - worsening  Global Rating of Change:   Symptom Irritability Level: high  Primary Movement Diagnosis: knee hypomobility  Primary Pain Phenotype: nociceptive   Patient Specific Functional Scale:   10/26/23: return to walking without AD 0/10, perform stair negotiation without pain 0/10, return to performing all housework without pain 0/10; Total 0/30   FOTO Prediction: 58 by visit 18  FOTO Progress: 31 at evaluation   Greatest Concern: swelling  Current Activity Plan: added to HEP (10/26)  Current Educational Needs: progressions    Patient is making good progress with manual stretching. She was able to perform SLR with minimal compensations. She had good understanding of updated HEP. Skilled PT continues to be required to guide progression of knee mobility and strength to allow her to return to all mobility activities without AD and perform housework without pain.         Plan: Continue with POC - monitor tolerance to treatment - progress as able NV       Daily Treatment Diary     DX: (R) TKA  EPOC: 24  Follow Up with Referring Provider:   Precautions: NA  CO-MORBIDITIES: Hx of AAA  HEP ACCESS CODE: [id number]        Treatment Diary  10/31 11/2       Manual Therapy         (R) Knee PROM Seated Flex  6 min  Supine Ext  2 min Seated Flex  8 min                                                             Therapeutic Exercise HEP         Recumbent Bike  ROM  6 min ROM  6 min       Ankle Pump 10/26         Supine Quad Set 10/26 5"x10 5"x10       Supine Heel Slide          Seated Quad Set 10/26         Seated Heel Slide 10/26         Long Sit Calf Stretch 10/26                             SLR 11/2 AAROM  Mod A  10x AROM  10x+5       H/L TB Hip ABD  GTB  5"x15 BTB  5"x20       Bridge 11/2 10x 10x2                 LAQ  10x 20x                  Neuromuscular Reeducation          SL Balance                    Standing Hip Flex 11/2 10x ea 20x ea       Standing Hip ABD 11/2 10x ea 20x ea       Standing Hip Ext 11/2 10x ea 20x ea       Standing HS Curl          TB TKE                    FWD Wt Shift  10x ea 10x ea       LAT Wt Shift          Mini Squat                    FWD Step Up          LAT Step Up                    Therapeutic Activity                              Gait Training                                        Modalities

## 2023-11-07 ENCOUNTER — OFFICE VISIT (OUTPATIENT)
Dept: PHYSICAL THERAPY | Facility: CLINIC | Age: 73
End: 2023-11-07
Payer: MEDICARE

## 2023-11-07 DIAGNOSIS — Z96.651 S/P TKR (TOTAL KNEE REPLACEMENT), RIGHT: Primary | ICD-10-CM

## 2023-11-07 PROCEDURE — 97110 THERAPEUTIC EXERCISES: CPT | Performed by: PHYSICAL THERAPIST

## 2023-11-07 PROCEDURE — 97112 NEUROMUSCULAR REEDUCATION: CPT | Performed by: PHYSICAL THERAPIST

## 2023-11-07 NOTE — PROGRESS NOTES
Daily Note     Today's date: 2023  Patient name: Sergio Huitron  : 1950  MRN: [id number]  Referring provider: Daniel Lowry DO  Dx:   Encounter Diagnosis     ICD-10-CM    1. S/P TKR (total knee replacement), right  Z96.651                      Subjective: Patient reports she is improving with ROM. She had no adverse reaction to her LV. She notes she has some increased posterior knee discomfort today. Objective: See treatment diary below      Assessment:   Stage: acute on chronic  Stability of Symptoms:  At Evaluation: evolving - worsening  Global Rating of Change:   Symptom Irritability Level: high  Primary Movement Diagnosis: knee hypomobility  Primary Pain Phenotype: nociceptive   Patient Specific Functional Scale:   10/26/23: return to walking without AD 0/10, perform stair negotiation without pain 0/10, return to performing all housework without pain 0/10; Total 0/30   FOTO Prediction: 58 by visit 18  FOTO Progress: 31 at evaluation   Greatest Concern: swelling  Current Activity Plan: added to HEP (10/26); transition to [facility] for in home walking ()  Current Educational Needs: progressions    Patient continues to progress well with manual stretching. She demonstrated good improvement with (R) leg strength - able to perform progressions with lunges and squats well. She is making good progress with PT. She demonstrated good form with walking with SPC. Skilled PT continues to be required to guide progression of knee mobility and strength to allow her to return to all mobility activities without AD and perform housework without pain.         Plan: Continue with POC - monitor tolerance to treatment - progress as able NV       Daily Treatment Diary     DX: (R) TKA  EPOC: 24  Follow Up with Referring Provider:   Precautions: NA  CO-MORBIDITIES: Hx of AAA  HEP ACCESS CODE: [id number]        Treatment Diary  10/31 11/2 11/7      Manual Therapy         (R) Knee PROM Seated Flex  6 min  Supine Ext  2 min Seated Flex  8 min Seated Flex  8 min                                                            Therapeutic Exercise  HEP         Recumbent Bike  ROM  6 min ROM  6 min ROM  6 min      Ankle Pump 10/26         Supine Quad Set 10/26 5"x10 5"x10       Supine Heel Slide          Seated Quad Set 10/26         Seated Heel Slide 10/26         Long Sit Calf Stretch 10/26                             SLR 11/2 AAROM  Mod A  10x AROM  10x+5 2x10      H/L TB Hip ABD  GTB  5"x15 BTB  5"x20       S/L Hip ABD    15x ea      Bridge 11/2 10x 10x2 20x                LAQ  10x 20x  2# 20x                Neuromuscular Reeducation          SL Balance                    Standing Hip Flex 11/2 10x ea 20x ea 1.5# 20x ea      Standing Hip ABD 11/2 10x ea 20x ea 1.5#  20x ea      Standing Hip Ext 11/2 10x ea 20x ea 1.5#   20x ea      Standing HS Curl          TB TKE                    FWD Mini Lunge  10x ea 10x ea 15x ea      LAT Mini Lunge    15x ea      Mini Squat    15x                FWD Step Up          LAT Step Up                    Therapeutic Activity                              Gait Training                                        Modalities

## 2023-11-09 ENCOUNTER — OFFICE VISIT (OUTPATIENT)
Dept: PHYSICAL THERAPY | Facility: CLINIC | Age: 73
End: 2023-11-09
Payer: MEDICARE

## 2023-11-09 DIAGNOSIS — Z96.651 S/P TKR (TOTAL KNEE REPLACEMENT), RIGHT: Primary | ICD-10-CM

## 2023-11-09 PROCEDURE — 97112 NEUROMUSCULAR REEDUCATION: CPT

## 2023-11-09 PROCEDURE — 97110 THERAPEUTIC EXERCISES: CPT

## 2023-11-09 PROCEDURE — 97140 MANUAL THERAPY 1/> REGIONS: CPT

## 2023-11-09 NOTE — PROGRESS NOTES
called at [id number]   Daily Note     Today's date: 2023  Patient name: Lelia Mitchell  : 1950  MRN: [id number]  Referring provider: Godfrey Norwood DO  Dx:   Encounter Diagnosis     ICD-10-CM    1. S/P TKR (total knee replacement), right  Z96.651           Start Time: 1405  Stop Time: 1451  Total time in clinic (min): 46 minutes    Subjective: Patient states that she does her exercises consistently at home. Objective: See treatment diary below      Assessment: Tolerated treatment well. Patient demonstrates visible Flex PROM 105-110* and extension at 0*. Mod challenge with correct form during fwd lunge with Vcs utilized to correct IR at hip and valgus at knee L>R. Patient demonstrated fatigue post treatment and would benefit from continued PT to strengthening R LE. Plan: Continue per plan of care.       Daily Treatment Diary     DX: (R) TKA  EPOC: 24  Follow Up with Referring Provider:   Precautions: NA  CO-MORBIDITIES: Hx of AAA  HEP ACCESS CODE: [id number]        Treatment Diary  10/31 11/2 11/7      Manual Therapy         (R) Knee PROM Seated Flex  6 min  Supine Ext  2 min Seated Flex  8 min Seated Flex  8 min Seated Flex  10 min + retrograde message to address swelling                                                           Therapeutic Exercise  HEP         Recumbent Bike  ROM  6 min ROM  6 min ROM  6 min ROM  6 min     Ankle Pump 10/26         Supine Quad Set 10/26 5"x10 5"x10       Supine Heel Slide          Seated Quad Set 10/26         Seated Heel Slide 10/26         Long Sit Calf Stretch 10/26                             SLR  AAROM  Mod A  10x AROM  10x+5 2x10 2x10     H/L TB Hip ABD  GTB  5"x15 BTB  5"x20       S/L Hip ABD    15x ea 20x ea     Bridge  10x 10x2 20x 20x               LAQ  10x 20x  2# 20x 2# 20x               Neuromuscular Reeducation          SL Balance                    Standing Hip Flex  10x ea 20x ea 1.5# 20x ea 1.5# 20x ea     Standing Hip ABD  10x ea 20x ea 1.5#  20x ea 1.5#  20x ea     Standing Hip Ext 11/2 10x ea 20x ea 1.5#   20x ea 1.5#   20x ea     Standing HS Curl          TB TKE                    FWD Mini Lunge  10x ea 10x ea 15x ea 15xea     LAT Mini Lunge    15x ea 15x ea     Mini Squat    15x 15x               FWD Step Up          LAT Step Up                    Therapeutic Activity                              Gait Training                                        Modalities

## 2023-11-14 ENCOUNTER — APPOINTMENT (OUTPATIENT)
Dept: PHYSICAL THERAPY | Facility: CLINIC | Age: 73
End: 2023-11-14
Payer: MEDICARE

## 2023-11-16 ENCOUNTER — OFFICE VISIT (OUTPATIENT)
Dept: PHYSICAL THERAPY | Facility: CLINIC | Age: 73
End: 2023-11-16
Payer: MEDICARE

## 2023-11-16 DIAGNOSIS — Z96.651 S/P TKR (TOTAL KNEE REPLACEMENT), RIGHT: Primary | ICD-10-CM

## 2023-11-16 PROCEDURE — 97112 NEUROMUSCULAR REEDUCATION: CPT | Performed by: PHYSICAL THERAPIST

## 2023-11-16 PROCEDURE — 97110 THERAPEUTIC EXERCISES: CPT | Performed by: PHYSICAL THERAPIST

## 2023-11-16 NOTE — PROGRESS NOTES
Daily Note     Today's date: 2023  Patient name: Osman Morales  : 1950  MRN: [id number]  Referring provider: Nolvia Lowe DO  Dx:   Encounter Diagnosis     ICD-10-CM    1. S/P TKR (total knee replacement), right  Z96.651                      Subjective: Patient states that she does her exercises consistently at home. She has not progressed with step ups at home yet. Objective: See treatment diary below      Assessment:   Stage: acute on chronic  Stability of Symptoms:  At Evaluation: evolving - worsening  Global Rating of Change:   Symptom Irritability Level: high  Primary Movement Diagnosis: knee hypomobility  Primary Pain Phenotype: nociceptive   Patient Specific Functional Scale:   10/26/23: return to walking without AD 0/10, perform stair negotiation without pain 0/10, return to performing all housework without pain 0/10; Total 0/30   FOTO Prediction: 58 by visit 18  FOTO Progress: 31 at evaluation   Greatest Concern: swelling  Current Activity Plan: added to [facility] (10/26); transition to [facility] for in home walking ()  Current Educational Needs: progressions     Patient continues to respond well to manual treatment. She had excellent form with step up exercises today - able to perform at 8" step height. She is making excellent progress with PT - recommended progressing with step over step form at home and testing out driving at an open parking lot when cleared by surgeon. Skilled PT continues to be required to guide progression of knee mobility and strength to allow her to return to all mobility activities without AD and perform housework without pain.           Plan: Continue with POC - monitor tolerance to treatment - progress as able NV         Daily Treatment Diary     DX: (R) TKA  EPOC: 24  Follow Up with Referring Provider:   Precautions: NA  CO-MORBIDITIES: Hx of AAA  HEP ACCESS CODE: [id number]        Treatment Diary        Manual Therapy          (R) Knee PROM Seated Flex  8 min Seated Flex  8 min Seated Flex  10 min + retrograde message to address swelling Seated  Flex  8 min                                                                  Therapeutic Exercise  HEP          Recumbent Bike  ROM  6 min ROM  6 min ROM  6 min ROM  6 min      Ankle Pump 10/26          Supine Quad Set 10/26 5"x10         Supine Heel Slide           Seated Quad Set 10/26          Seated Heel Slide 10/26          Long Sit Calf Stretch 10/26                                SLR 11/2 AROM  10x+5 2x10 2x10 1.5#  20x ea      H/L TB Hip ABD  BTB  5"x20         S/L Hip ABD   15x ea 20x ea 1.5#  20x ea      Bridge 11/2 10x2 20x 20x 5"x20                 LAQ  20x  2# 20x 2# 20x 3# 20x                 Neuromuscular Reeducation           SL Balance                      Standing Hip Flex 11/2 20x ea 1.5# 20x ea 1.5# 20x ea       Standing Hip ABD 11/2 20x ea 1.5#  20x ea 1.5#  20x ea       Standing Hip Ext 11/2 20x ea 1.5#   20x ea 1.5#   20x ea       Standing HS Curl           TB TKE                      FWD Mini Lunge  10x ea 15x ea 15xea       LAT Mini Lunge   15x ea 15x ea       Mini Squat   15x 15x 20x                 FWD Step Up     6" Step  10x  8" Step  10x      LAT Step Up     8" Step  15x ea                 Therapeutic Activity                                 Gait Training                                            Modalities

## 2023-11-21 ENCOUNTER — APPOINTMENT (OUTPATIENT)
Dept: PHYSICAL THERAPY | Facility: CLINIC | Age: 73
End: 2023-11-21
Payer: MEDICARE

## 2023-11-24 ENCOUNTER — APPOINTMENT (OUTPATIENT)
Dept: PHYSICAL THERAPY | Facility: CLINIC | Age: 73
End: 2023-11-24
Payer: MEDICARE

## 2023-11-28 ENCOUNTER — APPOINTMENT (OUTPATIENT)
Dept: PHYSICAL THERAPY | Facility: CLINIC | Age: 73
End: 2023-11-28
Payer: MEDICARE

## 2023-11-30 ENCOUNTER — APPOINTMENT (OUTPATIENT)
Dept: PHYSICAL THERAPY | Facility: CLINIC | Age: 73
End: 2023-11-30
Payer: MEDICARE

## 2024-01-05 ENCOUNTER — APPOINTMENT (OUTPATIENT)
Dept: PHYSICAL THERAPY | Facility: CLINIC | Age: 74
End: 2024-01-05
Payer: MEDICARE

## 2024-01-23 ENCOUNTER — OFFICE VISIT (OUTPATIENT)
Dept: PHYSICAL THERAPY | Facility: CLINIC | Age: 74
End: 2024-01-23
Payer: MEDICARE

## 2024-01-23 DIAGNOSIS — M54.41 CHRONIC RIGHT-SIDED LOW BACK PAIN WITH RIGHT-SIDED SCIATICA: Primary | ICD-10-CM

## 2024-01-23 DIAGNOSIS — G89.29 CHRONIC RIGHT-SIDED LOW BACK PAIN WITH RIGHT-SIDED SCIATICA: Primary | ICD-10-CM

## 2024-01-23 DIAGNOSIS — Z96.651 STATUS POST RIGHT PARTIAL KNEE REPLACEMENT: ICD-10-CM

## 2024-01-23 PROCEDURE — 97162 PT EVAL MOD COMPLEX 30 MIN: CPT | Performed by: PHYSICAL THERAPIST

## 2024-01-23 PROCEDURE — 97110 THERAPEUTIC EXERCISES: CPT | Performed by: PHYSICAL THERAPIST

## 2024-01-23 NOTE — PROGRESS NOTES
PT Evaluation     Today's date: 2024  Patient name: Charis Brizuela  : 1950  MRN: [id number]  Referring provider: Christopher Del Castillo PT  Dx:   Encounter Diagnosis     ICD-10-CM    1. Chronic right-sided low back pain with right-sided sciatica  M54.41     G89.29       2. Status post right partial knee replacement  Z96.651                      Assessment  Assessment details: Charis Brizuela is a 73 y.o. female presenting to outpatient physical therapy with chief complaints of (R) sided lower back pain with radiating pain to her ankle.  Patient describes onset of lower back pain in November with progression of activity after TKA. Patient displays with abnormal gait, restricted lumbar AROM, (B) LE strength deficits - non-myotomal pattern, good knee AROM, (-) slump/ SLR, and functional restrictions.  Patient's symptoms are multifactorial in nature with a primary movement diagnosis of poor motor control resulting in pathoanatomical signs and symptoms consistent with Chronic right-sided low back pain with right-sided sciatica  (primary encounter diagnosis), Status post right partial knee replacement resulting in limitations in her ability to walk over 2 miles, perform stair negotiation, and perform housework without pain.  No further referral appears necessary at this time based upon examination results.    Please contact me if you have any questions ([phone]). Thank you for the opportunity to share in the care of this patient.      Impairments: abnormal gait, abnormal or restricted ROM, activity intolerance, impaired physical strength, lacks appropriate home exercise program and pain with function    Symptom irritability: moderateUnderstanding of Dx/Px/POC: good   Prognosis: good  Prognosis details: Positive prognostic indicators include positive attitude toward recovery, motivated to improve, high self-efficacy, good understanding of condition, realistic expectations.  Negative prognostic indicators include  chronicity of symptoms.     Goals  STG to be met in 3 weeks:  - Increase Lumbar AROM: unrestricted all planes.  - Increase (B) LE strength by 1/2 MMT grade.  - I with HEP.  - Patient will be able to return to walking over 1 mile.   LTG to be met in 6 weeks:  - Increase (B) LE strength to 5/5 all planes.  - I with updated HEP.  - Patient will be able to return to walking over 2 miles and perform stair negotiation with step over step pattern.  - Patient will be able to perform all housework without increased pain.        Plan  Plan details: Prognosis above is given PT services 2x/week tapering to 1x/week over the next 6 weeks and home program adherence.     Patient would benefit from: skilled physical therapy  Planned modality interventions: cryotherapy and thermotherapy: hydrocollator packs  Planned therapy interventions: activity modification, joint mobilization, manual therapy, neuromuscular re-education, patient education, postural training, strengthening, stretching, therapeutic exercise, therapeutic activities, functional ROM exercises, home exercise program, gait training and body mechanics training  Plan of Care beginning date: 1/23/2024  Plan of Care expiration date: 3/7/2024  Treatment plan discussed with: patient        Subjective Evaluation    History of Present Illness  Mechanism of injury: At Evaluation (January 23, 2024): Patient underwent (R) TKA surgery in October - recovery was going well but she developed (R) buttocks pain radiating down the leg to the ankle in November.  She consulted with surgeons office and was advised to stop PT.  She returns today with no change in buttocks pain and radiating pain.      Red Flag Screen:  Patient denies recent fever, changes in bowel and bladder function, nausea and vomiting, weakness, unexplained weight loss, tingling, numbness, pain with coughing, or traumatic SYLWIA.      Greatest concern: persistent symptoms - impact on knee recovery    Quality of life:  good    Patient Goals  Patient goal: Patient Specific Functional Scale: return to being active with her grandkids 0/10, return to walking over 2 miles and negotiate steps without increased pain 0/10, return to performing housework 0/10; Total 0/30  Pain  Current pain rating: 3  At best pain ratin  At worst pain ratin  Location: (R) buttocks - radiating down leg to the ankle  Quality: dull ache and radiating  Alleviating factors: Activity modification, lying supine.  Exacerbated by: Walking, Standing, Housework, Lifting, Carrying, lying on (L) or (R) side, Sitting in a chair as the day progresses.    Social Support  Steps to enter house: yes  Stairs in house: yes   Lives in: multiple-level home  Lives with: spouse    Employment status: not working    Diagnostic Tests    FCE comments: No lumbar imaging recently       Objective     Static Posture   General Observations  Symmetrical weight bearing.     Lumbar Spine   Flattened.     Palpation     Additional Palpation Details  TTP (R) buttocks - no lumbar spine pain    Active Range of Motion     Lumbar   Flexion:  Restriction level: minimal  Extension:  Restriction level: moderate    Right Knee   Flexion: 120 degrees   Extension: 0 degrees     Additional Active Range of Motion Details  (B) SG: moderate restriction - increased pain with hips to (L)     Strength/Myotome Testing     Left Hip   Planes of Motion   Flexion: 4  Extension: 4-  Abduction: 4-    Right Hip   Planes of Motion   Flexion: 4  Extension: 3+  Abduction: 3+    Left Knee   Flexion: 5  Extension: 5    Right Knee   Flexion: 5  Extension: 4+    Left Ankle/Foot   Dorsiflexion: 4+  Plantar flexion: 4+  Great toe extension: 5    Right Ankle/Foot   Dorsiflexion: 4+  Plantar flexion: 4+  Great toe extension: 5    Tests     Lumbar   Negative SIJ compression and sacroiliac distraction.     Left   Negative crossed SLR, passive SLR and slump test.     Right   Negative crossed SLR, passive SLR and slump test.      Ambulation     Observational Gait   Gait: antalgic   Decreased right stance time.     Functional Assessment        Comments  Sit to stand - weight shifted (L)            Daily Treatment Diary     DX: (R) sided LBP with (R) sided sciatica, (R) TKA  EPOC: 3/7/24  Follow Up with Referring Provider:   Precautions: NA  CO-MORBIDITIES: Hx of AAA  HEP ACCESS CODE: [id number]       Stage: subacute  Etiology: overuse - progressing activity after TKA  Stability of Symptoms:  At Evaluation: stable - unchanged  Global Rating of Change:   Symptom Irritability Level: moderate  Primary Movement Diagnosis: poor motor control  Primary Pain Phenotype: nociceptive  Patient Specific Functional Scale:   1/23/24: return to being active with her grandkids 0/10, return to walking over 2 miles and negotiate steps without increased pain 0/10, return to performing housework 0/10; Total 0/30   FOTO Prediction: 66 by visit 11  FOTO Progress: 51 at evaluation   Greatest Concern: persistent symptoms - impact on knee recovery  Current Activity Plan: added to HEP (1/23)  Current Educational Needs: progressions       Treatment Diary          Manual Therapy                                                                        Therapeutic Exercise  HEP         Recumbent Bike                    SLR 1/23         S/L Hip ABD 1/23         H/L TB Hip ABD          Bridge 1/23                   Abdominal Brace          LFS: Alt LE                              Neuromuscular Reeducation          TB Counter Balance                    FWD Mini Lunge          LAT Mini Lunge          Mini Squat                    FWD Step Up          LAT Step Up                                        Therapeutic Activity                              Gait Training                                        Modalities

## 2024-01-23 NOTE — LETTER
2024    Gavin Parks MD  [address] [place] [address] [address] [place], [place] [id number]    Patient: Charis Brizuela   YOB: 1950   Date of Visit: 2024     Encounter Diagnosis     ICD-10-CM    1. Chronic right-sided low back pain with right-sided sciatica  M54.41     G89.29       2. Status post right partial knee replacement  Z96.651           Dear Dr. Parks:    Thank you for your recent referral of Charis Brizuela. Please review the attached evaluation summary from Charis's recent visit.     Please verify that you agree with the plan of care by signing the attached order.     If you have any questions or concerns, please do not hesitate to call.     I sincerely appreciate the opportunity to share in the care of one of your patients and hope to have another opportunity to work with you in the near future.       Sincerely,    Christopher Del Castillo, PT      Referring Provider:      I certify that I have read the below Plan of Care and certify the need for these services furnished under this plan of treatment while under my care.                    Gavin Parks MD  [address] [place] [address] [address] [place], [place] [id number]  Via Fax: [phone]          PT Evaluation     Today's date: 2024  Patient name: Charis Brizuela  : 1950  MRN: [id number]  Referring provider: Christopher Del Castillo, PT  Dx:   Encounter Diagnosis     ICD-10-CM    1. Chronic right-sided low back pain with right-sided sciatica  M54.41     G89.29       2. Status post right partial knee replacement  Z96.651                      Assessment  Assessment details: Charis Brizuela is a 73 y.o. female presenting to outpatient physical therapy with chief complaints of (R) sided lower back pain with radiating pain to her ankle.  Patient describes onset of lower back pain in November with progression of activity after TKA. Patient displays with abnormal gait, restricted lumbar AROM, (B) LE strength deficits - non-myotomal pattern, good knee AROM, (-)  slump/ SLR, and functional restrictions.  Patient's symptoms are multifactorial in nature with a primary movement diagnosis of poor motor control resulting in pathoanatomical signs and symptoms consistent with Chronic right-sided low back pain with right-sided sciatica  (primary encounter diagnosis), Status post right partial knee replacement resulting in limitations in her ability to walk over 2 miles, perform stair negotiation, and perform housework without pain.  No further referral appears necessary at this time based upon examination results.    Please contact me if you have any questions ([phone]). Thank you for the opportunity to share in the care of this patient.      Impairments: abnormal gait, abnormal or restricted ROM, activity intolerance, impaired physical strength, lacks appropriate home exercise program and pain with function    Symptom irritability: moderateUnderstanding of Dx/Px/POC: good   Prognosis: good  Prognosis details: Positive prognostic indicators include positive attitude toward recovery, motivated to improve, high self-efficacy, good understanding of condition, realistic expectations.  Negative prognostic indicators include chronicity of symptoms.     Goals  STG to be met in 3 weeks:  - Increase Lumbar AROM: unrestricted all planes.  - Increase (B) LE strength by 1/2 MMT grade.  - I with HEP.  - Patient will be able to return to walking over 1 mile.   LTG to be met in 6 weeks:  - Increase (B) LE strength to 5/5 all planes.  - I with updated HEP.  - Patient will be able to return to walking over 2 miles and perform stair negotiation with step over step pattern.  - Patient will be able to perform all housework without increased pain.        Plan  Plan details: Prognosis above is given PT services 2x/week tapering to 1x/week over the next 6 weeks and home program adherence.     Patient would benefit from: skilled physical therapy  Planned modality interventions: cryotherapy and  thermotherapy: hydrocollator packs  Planned therapy interventions: activity modification, joint mobilization, manual therapy, neuromuscular re-education, patient education, postural training, strengthening, stretching, therapeutic exercise, therapeutic activities, functional ROM exercises, home exercise program, gait training and body mechanics training  Plan of Care beginning date: 2024  Plan of Care expiration date: 3/7/2024  Treatment plan discussed with: patient        Subjective Evaluation    History of Present Illness  Mechanism of injury: At Evaluation (2024): Patient underwent (R) TKA surgery in October - recovery was going well but she developed (R) buttocks pain radiating down the leg to the ankle in November.  She consulted with surgeons office and was advised to stop PT.  She returns today with no change in buttocks pain and radiating pain.      Red Flag Screen:  Patient denies recent fever, changes in bowel and bladder function, nausea and vomiting, weakness, unexplained weight loss, tingling, numbness, pain with coughing, or traumatic SYLWIA.      Greatest concern: persistent symptoms - impact on knee recovery    Quality of life: good    Patient Goals  Patient goal: Patient Specific Functional Scale: return to being active with her grandkids 0/10, return to walking over 2 miles and negotiate steps without increased pain 0/10, return to performing housework 0/10; Total 0/30  Pain  Current pain rating: 3  At best pain ratin  At worst pain ratin  Location: (R) buttocks - radiating down leg to the ankle  Quality: dull ache and radiating  Alleviating factors: Activity modification, lying supine.  Exacerbated by: Walking, Standing, Housework, Lifting, Carrying, lying on (L) or (R) side, Sitting in a chair as the day progresses.    Social Support  Steps to enter house: yes  Stairs in house: yes   Lives in: multiple-level home  Lives with: spouse    Employment status: not  working    Diagnostic Tests    FCE comments: No lumbar imaging recently       Objective     Static Posture   General Observations  Symmetrical weight bearing.     Lumbar Spine   Flattened.     Palpation     Additional Palpation Details  TTP (R) buttocks - no lumbar spine pain    Active Range of Motion     Lumbar   Flexion:  Restriction level: minimal  Extension:  Restriction level: moderate    Right Knee   Flexion: 120 degrees   Extension: 0 degrees     Additional Active Range of Motion Details  (B) SG: moderate restriction - increased pain with hips to (L)     Strength/Myotome Testing     Left Hip   Planes of Motion   Flexion: 4  Extension: 4-  Abduction: 4-    Right Hip   Planes of Motion   Flexion: 4  Extension: 3+  Abduction: 3+    Left Knee   Flexion: 5  Extension: 5    Right Knee   Flexion: 5  Extension: 4+    Left Ankle/Foot   Dorsiflexion: 4+  Plantar flexion: 4+  Great toe extension: 5    Right Ankle/Foot   Dorsiflexion: 4+  Plantar flexion: 4+  Great toe extension: 5    Tests     Lumbar   Negative SIJ compression and sacroiliac distraction.     Left   Negative crossed SLR, passive SLR and slump test.     Right   Negative crossed SLR, passive SLR and slump test.     Ambulation     Observational Gait   Gait: antalgic   Decreased right stance time.     Functional Assessment        Comments  Sit to stand - weight shifted (L)            Daily Treatment Diary     DX: (R) sided LBP with (R) sided sciatica, (R) TKA  EPOC: 3/7/24  Follow Up with Referring Provider:   Precautions: NA  CO-MORBIDITIES: Hx of AAA  HEP ACCESS CODE: [id number]       Stage: subacute  Etiology: overuse - progressing activity after TKA  Stability of Symptoms:  At Evaluation: stable - unchanged  Global Rating of Change:   Symptom Irritability Level: moderate  Primary Movement Diagnosis: poor motor control  Primary Pain Phenotype: nociceptive  Patient Specific Functional Scale:   1/23/24: return to being active with her grandkids 0/10, return  to walking over 2 miles and negotiate steps without increased pain 0/10, return to performing housework 0/10; Total 0/30   FOTO Prediction: 66 by visit 11  FOTO Progress: 51 at evaluation   Greatest Concern: persistent symptoms - impact on knee recovery  Current Activity Plan: added to HEP (1/23)  Current Educational Needs: progressions       Treatment Diary          Manual Therapy                                                                        Therapeutic Exercise  HEP         Recumbent Bike                    SLR 1/23         S/L Hip ABD 1/23         H/L TB Hip ABD          Bridge 1/23                   Abdominal Brace          LFS: Alt LE                              Neuromuscular Reeducation          TB Counter Balance                    FWD Mini Lunge          LAT Mini Lunge          Mini Squat                    FWD Step Up          LAT Step Up                                        Therapeutic Activity                              Gait Training                                        Modalities

## 2024-01-24 ENCOUNTER — OFFICE VISIT (OUTPATIENT)
Dept: PHYSICAL THERAPY | Facility: CLINIC | Age: 74
End: 2024-01-24
Payer: MEDICARE

## 2024-01-24 DIAGNOSIS — G89.29 CHRONIC RIGHT-SIDED LOW BACK PAIN WITH RIGHT-SIDED SCIATICA: Primary | ICD-10-CM

## 2024-01-24 DIAGNOSIS — M54.41 CHRONIC RIGHT-SIDED LOW BACK PAIN WITH RIGHT-SIDED SCIATICA: Primary | ICD-10-CM

## 2024-01-24 DIAGNOSIS — Z96.651 STATUS POST RIGHT PARTIAL KNEE REPLACEMENT: ICD-10-CM

## 2024-01-24 PROCEDURE — 97112 NEUROMUSCULAR REEDUCATION: CPT | Performed by: PHYSICAL THERAPIST

## 2024-01-24 PROCEDURE — 97110 THERAPEUTIC EXERCISES: CPT | Performed by: PHYSICAL THERAPIST

## 2024-01-24 NOTE — LETTER
2024    Gavin Parks MD  [address] [place] [address] [address] [place], [place] [id number]    Patient: Charis Brizuela   YOB: 1950   Date of Visit: 2024     Encounter Diagnosis     ICD-10-CM    1. Chronic right-sided low back pain with right-sided sciatica  M54.41     G89.29       2. Status post right partial knee replacement  Z96.651           Dear Dr. Parks:    Thank you for your recent referral of Charis Brizuela. Please review the attached evaluation summary from Charis's recent visit.     Please verify that you agree with the plan of care by signing the attached order.     If you have any questions or concerns, please do not hesitate to call.     I sincerely appreciate the opportunity to share in the care of one of your patients and hope to have another opportunity to work with you in the near future.       Sincerely,    Christopher Del Castillo, PT      Referring Provider:      I certify that I have read the below Plan of Care and certify the need for these services furnished under this plan of treatment while under my care.                    Gavin Parks MD  [address] [place] [address] [address] [place] [address]  Via Fax: [phone]          Daily Note     Today's date: 2024  Patient name: Charis Brizuela  : 1950  MRN: [id number]  Referring provider: Christopher Del Castillo, PT  Dx:   Encounter Diagnosis     ICD-10-CM    1. Chronic right-sided low back pain with right-sided sciatica  M54.41     G89.29       2. Status post right partial knee replacement  Z96.651                      Subjective: Patient reports good tolerance to her initial evaluation.  She notes her knee is feeling ok - some lateral pain today.        Objective: See treatment diary below      Assessment:   Stage: subacute  Etiology: overuse - progressing activity after TKA  Stability of Symptoms:  At Evaluation: stable - unchanged  Global Rating of Change:   Symptom Irritability Level: moderate  Primary Movement Diagnosis: poor motor  "control  Primary Pain Phenotype: nociceptive  Patient Specific Functional Scale:   1/23/24: return to being active with her grandkids 0/10, return to walking over 2 miles and negotiate steps without increased pain 0/10, return to performing housework 0/10; Total 0/30   FOTO Prediction: 66 by visit 11  FOTO Progress: 51 at evaluation   Greatest Concern: persistent symptoms - impact on knee recovery  Current Activity Plan: added to HEP (1/23)  Current Educational Needs: progressions     Patient had good tolerance to mat-based exercises.  She noted some fatigue with SLR.  She was challenged with lunges and squats but noted no pain with UE support.  She also noted no pain with step up exercise.  Skilled PT continues to be required to guide progression of hip strength and control to allow her to return to going up and down steps without difficulty and playing with grandkids.    Plan: Continue with POC - monitor tolerance to treatment - progress as able [place]       Daily Treatment Diary     DX: (R) sided LBP with (R) sided sciatica, (R) TKA  EPOC: 3/7/24  Follow Up with Referring Provider:   Precautions: NA  CO-MORBIDITIES: Hx of AAA  HEP ACCESS CODE: [id number]       Treatment Diary  1/24        Manual Therapy                                                                        Therapeutic Exercise  HEP         Recumbent Bike  6 min                  SLR 1/23 20x ea        S/L Hip ABD 1/23         H/L TB Hip ABD  GTB  5\"x20        Bridge 1/23 5\"x20                  Abdominal Brace          LFS: Alt LE  20x ea                            Neuromuscular Reeducation          TB Counter Balance                    FWD Mini Lunge  15x ea        LAT Mini Lunge  15x ea        Mini Squat  15x                  FWD Step Up  6\" Step  10x ea        LAT Step Up                                        Therapeutic Activity                              Gait Training                                        Modalities                         "

## 2024-01-24 NOTE — PROGRESS NOTES
Daily Note     Today's date: 2024  Patient name: Charis Brizuela  : 1950  MRN: [id number]  Referring provider: Christopher Del Castillo, PT  Dx:   Encounter Diagnosis     ICD-10-CM    1. Chronic right-sided low back pain with right-sided sciatica  M54.41     G89.29       2. Status post right partial knee replacement  Z96.651                      Subjective: Patient reports good tolerance to her initial evaluation.  She notes her knee is feeling ok - some lateral pain today.        Objective: See treatment diary below      Assessment:   Stage: subacute  Etiology: overuse - progressing activity after TKA  Stability of Symptoms:  At Evaluation: stable - unchanged  Global Rating of Change:   Symptom Irritability Level: moderate  Primary Movement Diagnosis: poor motor control  Primary Pain Phenotype: nociceptive  Patient Specific Functional Scale:   24: return to being active with her grandkids 0/10, return to walking over 2 miles and negotiate steps without increased pain 0/10, return to performing housework 0/10; Total 0/30   FOTO Prediction: 66 by visit 11  FOTO Progress: 51 at evaluation   Greatest Concern: persistent symptoms - impact on knee recovery  Current Activity Plan: added to HEP ()  Current Educational Needs: progressions     Patient had good tolerance to mat-based exercises.  She noted some fatigue with SLR.  She was challenged with lunges and squats but noted no pain with UE support.  She also noted no pain with step up exercise.  Skilled PT continues to be required to guide progression of hip strength and control to allow her to return to going up and down steps without difficulty and playing with grandkids.    Plan: Continue with POC - monitor tolerance to treatment - progress as able [place]       Daily Treatment Diary     DX: (R) sided LBP with (R) sided sciatica, (R) TKA  EPOC: 3/7/24  Follow Up with Referring Provider:   Precautions: NA  CO-MORBIDITIES: Hx of AAA  HEP ACCESS CODE: [id number]  "      Treatment Diary  1/24        Manual Therapy                                                                        Therapeutic Exercise  HEP         Recumbent Bike  6 min                  SLR 1/23 20x ea        S/L Hip ABD 1/23         H/L TB Hip ABD  GTB  5\"x20        Bridge 1/23 5\"x20                  Abdominal Brace          LFS: Alt LE  20x ea                            Neuromuscular Reeducation          TB Counter Balance                    FWD Mini Lunge  15x ea        LAT Mini Lunge  15x ea        Mini Squat  15x                  FWD Step Up  6\" Step  10x ea        LAT Step Up                                        Therapeutic Activity                              Gait Training                                        Modalities                                      "

## 2024-01-30 ENCOUNTER — OFFICE VISIT (OUTPATIENT)
Dept: PHYSICAL THERAPY | Facility: CLINIC | Age: 74
End: 2024-01-30
Payer: MEDICARE

## 2024-01-30 DIAGNOSIS — M54.41 CHRONIC RIGHT-SIDED LOW BACK PAIN WITH RIGHT-SIDED SCIATICA: Primary | ICD-10-CM

## 2024-01-30 DIAGNOSIS — G89.29 CHRONIC RIGHT-SIDED LOW BACK PAIN WITH RIGHT-SIDED SCIATICA: Primary | ICD-10-CM

## 2024-01-30 DIAGNOSIS — Z96.651 STATUS POST RIGHT PARTIAL KNEE REPLACEMENT: ICD-10-CM

## 2024-01-30 PROCEDURE — 97112 NEUROMUSCULAR REEDUCATION: CPT | Performed by: PHYSICAL THERAPIST

## 2024-01-30 PROCEDURE — 97110 THERAPEUTIC EXERCISES: CPT | Performed by: PHYSICAL THERAPIST

## 2024-01-30 NOTE — PROGRESS NOTES
"Daily Note     Today's date: 2024  Patient name: Charis Brizuela  : 1950  MRN: [id number]  Referring provider: Christopher Del Castillo, PT  Dx:   Encounter Diagnosis     ICD-10-CM    1. Chronic right-sided low back pain with right-sided sciatica  M54.41     G89.29       2. Status post right partial knee replacement  Z96.651                      Subjective: Patient reports good tolerance to her LV.  She notes her hips are feeling stronger and she is having less overall knee and back pain.  She is very pleased with her progress in such a short amount of time.        Objective: See treatment diary below      Assessment:   Stage: subacute  Etiology: overuse - progressing activity after TKA  Stability of Symptoms:  At Evaluation: stable - unchanged  Global Rating of Change:   Symptom Irritability Level: moderate  Primary Movement Diagnosis: poor motor control  Primary Pain Phenotype: nociceptive  Patient Specific Functional Scale:   24: return to being active with her grandkids 0/10, return to walking over 2 miles and negotiate steps without increased pain 0/10, return to performing housework 0/10; Total 0/30   FOTO Prediction: 66 by visit 11  FOTO Progress: 51 at evaluation   Greatest Concern: persistent symptoms - impact on knee recovery  Current Activity Plan: added to HEP ();   Current Educational Needs: progressions     Patient continues to respond well to exercises.  She was challenged with progressions with weightbearing exercises.  She was surprised with how well she did with step ups with 8\" step.  She was advised to resume step over step for ascending steps.  Skilled PT continues to be required to guide progression of hip strength and control to allow her to return to going up and down steps without difficulty and playing with grandkids.    Plan: Continue with POC - monitor tolerance to treatment - progress as able [place]       Daily Treatment Diary     DX: (R) sided LBP with (R) sided sciatica, (R) " "TKA  EPOC: 3/7/24  Follow Up with Referring Provider:   Precautions: NA  CO-MORBIDITIES: Hx of AAA  HEP ACCESS CODE: [id number]       Treatment Diary  1/24 1/30       Manual Therapy                                                                        Therapeutic Exercise  HEP         Recumbent Bike  6 min 6 min                 SLR 1/23 20x ea 20x ea       S/L Hip ABD 1/23         H/L TB Hip ABD  GTB  5\"x20 GTB  5\"x20       Bridge 1/23 5\"x20 5\"x20                 Abdominal Brace          LFS: Alt LE  20x ea 20x ea                           Neuromuscular Reeducation          TB Counter Balance                    FWD Mini Lunge  15x ea 15x ea       LAT Mini Lunge  15x ea 15x ea       Mini Squat  15x 15x                 FWD Step Up  6\" Step  10x ea 8\" Step  2x10 ea       LAT Step Up          LAT Heel Tap   6\" Step  2x10 ea                           Therapeutic Activity                              Gait Training                                        Modalities                                      "

## 2024-02-02 ENCOUNTER — OFFICE VISIT (OUTPATIENT)
Dept: PHYSICAL THERAPY | Facility: CLINIC | Age: 74
End: 2024-02-02
Payer: MEDICARE

## 2024-02-02 DIAGNOSIS — Z96.651 STATUS POST TOTAL RIGHT KNEE REPLACEMENT: ICD-10-CM

## 2024-02-02 DIAGNOSIS — M54.41 CHRONIC RIGHT-SIDED LOW BACK PAIN WITH RIGHT-SIDED SCIATICA: Primary | ICD-10-CM

## 2024-02-02 DIAGNOSIS — G89.29 CHRONIC RIGHT-SIDED LOW BACK PAIN WITH RIGHT-SIDED SCIATICA: Primary | ICD-10-CM

## 2024-02-02 PROCEDURE — 97112 NEUROMUSCULAR REEDUCATION: CPT | Performed by: PHYSICAL THERAPIST

## 2024-02-02 PROCEDURE — 97110 THERAPEUTIC EXERCISES: CPT | Performed by: PHYSICAL THERAPIST

## 2024-02-02 NOTE — PROGRESS NOTES
"Daily Note     Today's date: 2024  Patient name: Charis Brizuela  : 1950  MRN: [id number]  Referring provider: Christopher Del Castillo, PT  Dx:   Encounter Diagnosis     ICD-10-CM    1. Chronic right-sided low back pain with right-sided sciatica  M54.41     G89.29       2. Status post right partial knee replacement  Z96.651                      Subjective: Patient reports increased knee pain for 12 hours following her LV.  She continues to struggle descending steps.         Objective: See treatment diary below      Assessment:   Stage: subacute  Etiology: overuse - progressing activity after TKA  Stability of Symptoms:  At Evaluation: stable - unchanged  Global Rating of Change:   Symptom Irritability Level: moderate  Primary Movement Diagnosis: poor motor control  Primary Pain Phenotype: nociceptive  Patient Specific Functional Scale:   24: return to being active with her grandkids 0/10, return to walking over 2 miles and negotiate steps without increased pain 0/10, return to performing housework 0/10; Total 0/30   FOTO Prediction: 66 by visit 11  FOTO Progress: 51 at evaluation   Greatest Concern: persistent symptoms - impact on knee recovery  Current Activity Plan: added to HEP ();   Current Educational Needs: progressions     Patient had good tolerance to exercise progressions.  She was og to perform stair negotiation well in the stairwell and in the clinic with 8\" step today.  She is making excellent progress with PT.  She was encouraged to continue pushing herself with steps at home.  Skilled PT continues to be required to guide progression of hip strength and control to allow her to return to going up and down steps without difficulty and playing with grandkids.    Plan: Continue with POC - monitor tolerance to treatment - progress as able [place]       Daily Treatment Diary     DX: (R) sided LBP with (R) sided sciatica, (R) TKA  EPOC: 3/7/24  Follow Up with Referring Provider:   Precautions: " "NA  CO-MORBIDITIES: Hx of AAA  HEP ACCESS CODE: [id number]       Treatment Diary  1/24 1/30 2/2      Manual Therapy                                                                        Therapeutic Exercise  HEP         Recumbent Bike  6 min 6 min 6 min                SLR 1/23 20x ea 20x ea 1# 20x ea      S/L Hip ABD 1/23   1# 20x ea      H/L TB Hip ABD  GTB  5\"x20 GTB  5\"x20       Bridge 1/23 5\"x20 5\"x20 5\"x20                Abdominal Brace          LFS: Alt LE  20x ea 20x ea                           Neuromuscular Reeducation          TB Counter Balance                    FWD Mini Lunge  15x ea 15x ea 20x ea      LAT Mini Lunge  15x ea 15x ea 20x ea      Mini Squat  15x 15x 20x                FWD Step Up  6\" Step  10x ea 8\" Step  2x10 ea 8\" Step  Up and Over  20x ea      LAT Step Up    8\" Step  Up and Over  20x ea      LAT Heel Tap   6\" Step  2x10 ea                           Therapeutic Activity                              Gait Training                                        Modalities                                      "

## 2024-02-05 ENCOUNTER — OFFICE VISIT (OUTPATIENT)
Dept: PHYSICAL THERAPY | Facility: CLINIC | Age: 74
End: 2024-02-05
Payer: MEDICARE

## 2024-02-05 DIAGNOSIS — M54.41 CHRONIC RIGHT-SIDED LOW BACK PAIN WITH RIGHT-SIDED SCIATICA: Primary | ICD-10-CM

## 2024-02-05 DIAGNOSIS — Z96.651 STATUS POST TOTAL RIGHT KNEE REPLACEMENT: ICD-10-CM

## 2024-02-05 DIAGNOSIS — G89.29 CHRONIC RIGHT-SIDED LOW BACK PAIN WITH RIGHT-SIDED SCIATICA: Primary | ICD-10-CM

## 2024-02-05 PROCEDURE — 97140 MANUAL THERAPY 1/> REGIONS: CPT | Performed by: PHYSICAL THERAPIST

## 2024-02-05 PROCEDURE — 97110 THERAPEUTIC EXERCISES: CPT | Performed by: PHYSICAL THERAPIST

## 2024-02-05 NOTE — PROGRESS NOTES
Daily Note     Today's date: 2024  Patient name: Charis Brizuela  : 1950  MRN: [id number]  Referring provider: Christopher Del Castillo, PT  Dx:   Encounter Diagnosis     ICD-10-CM    1. Chronic right-sided low back pain with right-sided sciatica  M54.41     G89.29       2. Status post total right knee replacement  Z96.651                      Subjective: Patient reports good tolerance to her LV.  She notes attending a  on  afternoon - drove 1.25 hours each way and stood for over 1 hour in wedge shoes.  She had severe pain that night and difficulty going up and down steps as well as straightening (R) knee fully.        Objective: See treatment diary below      Assessment:   Stage: subacute  Etiology: overuse - progressing activity after TKA  Stability of Symptoms:  At Evaluation: stable - unchanged  Global Rating of Change:   Symptom Irritability Level: moderate  Primary Movement Diagnosis: poor motor control  Primary Pain Phenotype: nociceptive  Patient Specific Functional Scale:   24: return to being active with her grandkids 0/10, return to walking over 2 miles and negotiate steps without increased pain 0/10, return to performing housework 0/10; Total 0/30   FOTO Prediction: 66 by visit 11  FOTO Progress: 51 at evaluation   Greatest Concern: persistent symptoms - impact on knee recovery  Current Activity Plan: added to HEP ();   Current Educational Needs: progressions     Patient had good tolerance to manual treatment - knee ROM was full after.  She had good tolerance to gentle treatment.  Educated patient on symptom response and overdoing things for the stage of rehab that she is in.  Skilled PT continues to be required to guide progression of hip strength and control to allow her to return to going up and down steps without difficulty and playing with grandkids.    Plan: Continue with POC - monitor tolerance to treatment - progress as able [place]       Daily Treatment Diary     DX: (R) sided  "LBP with (R) sided sciatica, (R) TKA  EPOC: 3/7/24  Follow Up with Referring Provider:   Precautions: NA  CO-MORBIDITIES: Hx of AAA  HEP ACCESS CODE: [id number]       Treatment Diary  1/24 1/30 2/2 2/5     Manual Therapy         (R) Knee PROM    Seated  Flex and Ext  8 min                                                           Therapeutic Exercise  HEP         Recumbent Bike  6 min 6 min 6 min 6 min     LAQ     20x      SLR 1/23 20x ea 20x ea 1# 20x ea      S/L Hip ABD 1/23   1# 20x ea      H/L TB Hip ABD  GTB  5\"x20 GTB  5\"x20       Bridge 1/23 5\"x20 5\"x20 5\"x20 5\"x20               Abdominal Brace          LFS: Alt LE  20x ea 20x ea                           Neuromuscular Reeducation          TB Counter Balance                    FWD Mini Lunge  15x ea 15x ea 20x ea      LAT Mini Lunge  15x ea 15x ea 20x ea      Mini Squat  15x 15x 20x                FWD Step Up  6\" Step  10x ea 8\" Step  2x10 ea 8\" Step  Up and Over  20x ea      LAT Step Up    8\" Step  Up and Over  20x ea      LAT Heel Tap   6\" Step  2x10 ea                           Therapeutic Activity                              Gait Training                                        Modalities                                      "

## 2024-02-09 ENCOUNTER — OFFICE VISIT (OUTPATIENT)
Dept: PHYSICAL THERAPY | Facility: CLINIC | Age: 74
End: 2024-02-09
Payer: MEDICARE

## 2024-02-09 DIAGNOSIS — Z96.651 STATUS POST RIGHT PARTIAL KNEE REPLACEMENT: ICD-10-CM

## 2024-02-09 DIAGNOSIS — Z96.651 STATUS POST TOTAL RIGHT KNEE REPLACEMENT: ICD-10-CM

## 2024-02-09 DIAGNOSIS — M54.41 CHRONIC RIGHT-SIDED LOW BACK PAIN WITH RIGHT-SIDED SCIATICA: Primary | ICD-10-CM

## 2024-02-09 DIAGNOSIS — G89.29 CHRONIC RIGHT-SIDED LOW BACK PAIN WITH RIGHT-SIDED SCIATICA: Primary | ICD-10-CM

## 2024-02-09 PROCEDURE — 97112 NEUROMUSCULAR REEDUCATION: CPT

## 2024-02-09 PROCEDURE — 97110 THERAPEUTIC EXERCISES: CPT

## 2024-02-09 NOTE — PROGRESS NOTES
"Daily Note     Today's date: 2024  Patient name: Charis Brizuela  : 1950  MRN: [id number]  Referring provider: Christopher Del Castillo, PT  Dx:   Encounter Diagnosis     ICD-10-CM    1. Chronic right-sided low back pain with right-sided sciatica  M54.41     G89.29       2. Status post total right knee replacement  Z96.651       3. Status post right partial knee replacement  Z96.651                      Subjective: Charis is back to baseline after the flare up. She has been doing more walking, and is compliant with HEP      Objective: See treatment diary below      Assessment: Tolerated treatment well. Reintroduced stepping tasks, starting at 6\" up to 8\", completing without adverse effects. Cuing for mini squats to incorporate hip hinge, with good carryover. Proximal hip strengthening performed without weights today, add [place]. Continued PT would be beneficial to improve function.          Assessment:   Stage: subacute  Etiology: overuse - progressing activity after TKA  Stability of Symptoms:  At Evaluation: stable - unchanged  Global Rating of Change:   Symptom Irritability Level: moderate  Primary Movement Diagnosis: poor motor control  Primary Pain Phenotype: nociceptive  Patient Specific Functional Scale:   24: return to being active with her grandkids 0/10, return to walking over 2 miles and negotiate steps without increased pain 0/10, return to performing housework 0/10; Total 0/30   FOTO Prediction: 66 by visit 11  FOTO Progress: 51 at evaluation   Greatest Concern: persistent symptoms - impact on knee recovery  Current Activity Plan: added to HEP ();   Current Educational Needs: progressions     Plan: Continue per plan of care.       Daily Treatment Diary     DX: (R) sided LBP with (R) sided sciatica, (R) TKA  EPOC: 3/7/24  Follow Up with Referring Provider:   Precautions: NA  CO-MORBIDITIES: Hx of AAA  HEP ACCESS CODE: [id number]       Treatment Diary   2 2    Manual Therapy       " "  (R) Knee PROM    Seated  Flex and Ext  8 min                                                           Therapeutic Exercise  HEP         Recumbent Bike  6 min 6 min 6 min 6 min 6 min    LAQ     20x  20x     SLR 1/23 20x ea 20x ea 1# 20x ea  2x10 ea    S/L Hip ABD 1/23   1# 20x ea  2x10 ea    H/L TB Hip ABD  GTB  5\"x20 GTB  5\"x20       Bridge 1/23 5\"x20 5\"x20 5\"x20 5\"x20 5\"x20              Abdominal Brace          LFS: Alt LE  20x ea 20x ea                           Neuromuscular Reeducation          TB Counter Balance                    FWD Mini Lunge  15x ea 15x ea 20x ea  20x    LAT Mini Lunge  15x ea 15x ea 20x ea  20x    Mini Squat  15x 15x 20x  20x              FWD Step Up  6\" Step  10x ea 8\" Step  2x10 ea 8\" Step  Up and Over  20x ea  8\" Step  Up and Over  20x ea    LAT Step Up    8\" Step  Up and Over  20x ea  8\" Step  Up and Over  20x ea    LAT Heel Tap   6\" Step  2x10 ea                           Therapeutic Activity                              Gait Training                                        Modalities                                        "

## 2024-02-16 ENCOUNTER — APPOINTMENT (OUTPATIENT)
Dept: PHYSICAL THERAPY | Facility: CLINIC | Age: 74
End: 2024-02-16
Payer: MEDICARE

## 2024-02-23 ENCOUNTER — APPOINTMENT (OUTPATIENT)
Dept: PHYSICAL THERAPY | Facility: CLINIC | Age: 74
End: 2024-02-23
Payer: MEDICARE

## 2024-03-01 ENCOUNTER — OFFICE VISIT (OUTPATIENT)
Dept: PHYSICAL THERAPY | Facility: CLINIC | Age: 74
End: 2024-03-01
Payer: MEDICARE

## 2024-03-01 DIAGNOSIS — M54.41 CHRONIC RIGHT-SIDED LOW BACK PAIN WITH RIGHT-SIDED SCIATICA: Primary | ICD-10-CM

## 2024-03-01 DIAGNOSIS — Z96.651 STATUS POST TOTAL RIGHT KNEE REPLACEMENT: ICD-10-CM

## 2024-03-01 DIAGNOSIS — G89.29 CHRONIC RIGHT-SIDED LOW BACK PAIN WITH RIGHT-SIDED SCIATICA: Primary | ICD-10-CM

## 2024-03-01 PROCEDURE — 97112 NEUROMUSCULAR REEDUCATION: CPT | Performed by: PHYSICAL THERAPIST

## 2024-03-01 PROCEDURE — 97110 THERAPEUTIC EXERCISES: CPT | Performed by: PHYSICAL THERAPIST

## 2024-03-01 NOTE — PROGRESS NOTES
Daily Note     Today's date: 3/1/2024  Patient name: Charis Brizuela  : 1950  MRN: [id number]  Referring provider: Christopher Del Castillo, PT  Dx:   Encounter Diagnosis     ICD-10-CM    1. Chronic right-sided low back pain with right-sided sciatica  M54.41     G89.29       2. Status post total right knee replacement  Z96.651                      Subjective: Patient reports her knees have been more stiff the past few days - attributed to weather changes.  She reports she has been doing better with stair negotiation.        Objective: See treatment diary below      Assessment:   Stage: subacute  Etiology: overuse - progressing activity after TKA  Stability of Symptoms:  At Evaluation: stable - unchanged  Global Rating of Change:   Symptom Irritability Level: moderate  Primary Movement Diagnosis: poor motor control  Primary Pain Phenotype: nociceptive  Patient Specific Functional Scale:   24: return to being active with her grandkids 0/10, return to walking over 2 miles and negotiate steps without increased pain 0/10, return to performing housework 0/10; Total 0/30   FOTO Prediction: 66 by visit 11  FOTO Progress: 51 at evaluation   Greatest Concern: persistent symptoms - impact on knee recovery  Current Activity Plan: added to HEP ();   Current Educational Needs: progressions     Patient continues to progress well with ROM.  She had good overall tolerance to exercise progressions.  She is making fantastic progress with her knee and back.  Skilled PT continues to be required to guide progression of hip strength and control to allow her to return to going up and down steps without difficulty and playing with grandkids.    Plan: Continue with POC - monitor tolerance to treatment - progress as able [place]       Daily Treatment Diary     DX: (R) sided LBP with (R) sided sciatica, (R) TKA  EPOC: 3/7/24  Follow Up with Referring Provider:   Precautions: NA  CO-MORBIDITIES: Hx of AAA  HEP ACCESS CODE: [id number]  "      Treatment Diary  2/2 2/5 3/1      Manual Therapy         (R) Knee PROM  Seated  Flex and Ext  8 min Seated  Flex and Ext  8 min                                                            Therapeutic Exercise  HEP         Recumbent Bike  6 min 6 min 8 min      LAQ   20x        SLR 1/23 1# 20x ea  1.5# 20x ea      S/L Hip ABD 1/23 1# 20x ea  1.5# 20x ea      H/L TB Hip ABD          Bridge 1/23 5\"x20 5\"x20 5\"x20                Abdominal Brace          LFS: Alt LE                              Neuromuscular Reeducation          TB Counter Balance                    FWD Mini Lunge  20x ea        LAT Mini Lunge  20x ea        Mini Squat  20x  20x                 FWD Step Up  8\" Step  Up and Over  20x ea  8\" Step Up and Over  20x ea      LAT Step Up  8\" Step  Up and Over  20x ea        LAT Heel Tap                              Therapeutic Activity                              Gait Training                                        Modalities                                      "

## 2024-03-08 ENCOUNTER — OFFICE VISIT (OUTPATIENT)
Dept: PHYSICAL THERAPY | Facility: CLINIC | Age: 74
End: 2024-03-08
Payer: MEDICARE

## 2024-03-08 DIAGNOSIS — M54.41 CHRONIC RIGHT-SIDED LOW BACK PAIN WITH RIGHT-SIDED SCIATICA: Primary | ICD-10-CM

## 2024-03-08 DIAGNOSIS — Z96.651 STATUS POST TOTAL RIGHT KNEE REPLACEMENT: ICD-10-CM

## 2024-03-08 DIAGNOSIS — G89.29 CHRONIC RIGHT-SIDED LOW BACK PAIN WITH RIGHT-SIDED SCIATICA: Primary | ICD-10-CM

## 2024-03-08 PROCEDURE — 97110 THERAPEUTIC EXERCISES: CPT | Performed by: PHYSICAL THERAPIST

## 2024-03-08 PROCEDURE — 97112 NEUROMUSCULAR REEDUCATION: CPT | Performed by: PHYSICAL THERAPIST

## 2024-03-08 NOTE — PROGRESS NOTES
Daily Note     Today's date: 3/8/2024  Patient name: Charis Brizuela  : 1950  MRN: [id number]  Referring provider: Christopher Del Castillo, PT  Dx:   Encounter Diagnosis     ICD-10-CM    1. Chronic right-sided low back pain with right-sided sciatica  M54.41     G89.29       2. Status post total right knee replacement  Z96.651                      Subjective: Patient reports her knees are a little more sore today but no significant pain.  She has some pulling in the quads with stair negotiation.        Objective: See treatment diary below      Assessment:   Stage: subacute  Etiology: overuse - progressing activity after TKA  Stability of Symptoms:  At Evaluation: stable - unchanged  Global Rating of Change:   Symptom Irritability Level: moderate  Primary Movement Diagnosis: poor motor control  Primary Pain Phenotype: nociceptive  Patient Specific Functional Scale:   24: return to being active with her grandkids 0/10, return to walking over 2 miles and negotiate steps without increased pain 0/10, return to performing housework 0/10; Total 0/30   FOTO Prediction: 66 by visit 11  FOTO Progress: 51 at evaluation   Greatest Concern: persistent symptoms - impact on knee recovery  Current Activity Plan: added to HEP ();   Current Educational Needs: progressions     Patient had good tolerance to manual treatment.  She was challenged with WB exercises but had no increased pain throughout treatment.  Skilled PT continues to be required to guide progression of hip strength and control to allow her to return to going up and down steps without difficulty and playing with grandkids.    Plan: Continue with POC - monitor tolerance to treatment - progress as able NV       Daily Treatment Diary     DX: (R) sided LBP with (R) sided sciatica, (R) TKA  EPOC: 3/7/24  Follow Up with Referring Provider:   Precautions: NA  CO-MORBIDITIES: Hx of AAA  HEP ACCESS CODE: [id number]       Treatment Diary  2/2 2/5 3/1 3/8     Manual Therapy       "   (R) Knee PROM  Seated  Flex and Ext  8 min Seated  Flex and Ext  8 min Seated  Flex and Ext  8 min                                                           Therapeutic Exercise  HEP         Recumbent Bike  6 min 6 min 8 min 6 min     LAQ   20x        SLR 1/23 1# 20x ea  1.5# 20x ea 2# 20x ea     S/L Hip ABD 1/23 1# 20x ea  1.5# 20x ea 2# 20x ea     H/L TB Hip ABD          Bridge 1/23 5\"x20 5\"x20 5\"x20 5\"x20               Abdominal Brace          LFS: Alt LE                              Neuromuscular Reeducation          TB Counter Balance                    FWD Mini Lunge  20x ea        LAT Mini Lunge  20x ea        Mini Squat  20x  20x  20x               FWD Step Up  8\" Step  Up and Over  20x ea  8\" Step Up and Over  20x ea 8\" Step  Up and Over 20x ea     LAT Step Up  8\" Step  Up and Over  20x ea   8\" Step  Up and Over 20x ea     LAT Heel Tap                              Therapeutic Activity                              Gait Training                                        Modalities                                      "

## 2024-03-15 ENCOUNTER — OFFICE VISIT (OUTPATIENT)
Dept: PHYSICAL THERAPY | Facility: CLINIC | Age: 74
End: 2024-03-15
Payer: MEDICARE

## 2024-03-15 DIAGNOSIS — Z96.651 STATUS POST TOTAL RIGHT KNEE REPLACEMENT: ICD-10-CM

## 2024-03-15 DIAGNOSIS — G89.29 CHRONIC RIGHT-SIDED LOW BACK PAIN WITH RIGHT-SIDED SCIATICA: Primary | ICD-10-CM

## 2024-03-15 DIAGNOSIS — M54.41 CHRONIC RIGHT-SIDED LOW BACK PAIN WITH RIGHT-SIDED SCIATICA: Primary | ICD-10-CM

## 2024-03-15 PROCEDURE — 97140 MANUAL THERAPY 1/> REGIONS: CPT | Performed by: PHYSICAL THERAPIST

## 2024-03-15 PROCEDURE — 97110 THERAPEUTIC EXERCISES: CPT | Performed by: PHYSICAL THERAPIST

## 2024-03-15 NOTE — PROGRESS NOTES
Daily Note     Today's date: 3/15/2024  Patient name: Charis Brizuela  : 1950  MRN: [id number]  Referring provider: Christopher Del Castillo, PT  Dx:   Encounter Diagnosis     ICD-10-CM    1. Chronic right-sided low back pain with right-sided sciatica  M54.41     G89.29       2. Status post total right knee replacement  Z96.651                      Subjective: Patient reports she is getting stronger ut still having some difficulty with stair negotiation.  She feels confident progressing independently at this time.        Objective: See treatment diary below      Assessment:   Stage: subacute  Etiology: overuse - progressing activity after TKA  Stability of Symptoms:  At Evaluation: stable - unchanged  Global Rating of Change:   Symptom Irritability Level: moderate  Primary Movement Diagnosis: poor motor control  Primary Pain Phenotype: nociceptive  Patient Specific Functional Scale:   24: return to being active with her grandkids 0/10, return to walking over 2 miles and negotiate steps without increased pain 0/10, return to performing housework 0/10; Total 0/30   FOTO Prediction: 66 by visit 11  FOTO Progress: 51 at evaluation   Greatest Concern: persistent symptoms - impact on knee recovery  Current Activity Plan: added to HEP ();   Current Educational Needs: progressions     Patient had good exercise form and recall throughout treatment.  At this time it is recommended that she continue with an I HEP.  She is to contact me if she has return of symptoms or any questions/ concerns.        Plan: DC to I HEP       Daily Treatment Diary     DX: (R) sided LBP with (R) sided sciatica, (R) TKA  EPOC: 3/7/24  Follow Up with Referring Provider:   Precautions: NA  CO-MORBIDITIES: Hx of AAA  HEP ACCESS CODE: [id number]       Treatment Diary  2/2 2/5 3/1 3/8 3/15    Manual Therapy         (R) Knee PROM  Seated  Flex and Ext  8 min Seated  Flex and Ext  8 min Seated  Flex and Ext  8 min Seated  Flex and Ext  8 min            "                                               Therapeutic Exercise  HEP         Recumbent Bike  6 min 6 min 8 min 6 min 6 min    LAQ   20x        SLR 1/23 1# 20x ea  1.5# 20x ea 2# 20x ea 2# 20x ea    S/L Hip ABD 1/23 1# 20x ea  1.5# 20x ea 2# 20x ea 2# 20x ea    H/L TB Hip ABD          Bridge 1/23 5\"x20 5\"x20 5\"x20 5\"x20 5\"x20              Abdominal Brace          LFS: Alt LE                              Neuromuscular Reeducation          TB Counter Balance                    FWD Mini Lunge  20x ea        LAT Mini Lunge  20x ea        Mini Squat  20x  20x  20x               FWD Step Up  8\" Step  Up and Over  20x ea  8\" Step Up and Over  20x ea 8\" Step  Up and Over 20x ea     LAT Step Up  8\" Step  Up and Over  20x ea   8\" Step  Up and Over 20x ea     LAT Heel Tap                              Therapeutic Activity                              Gait Training                                        Modalities                                      "

## 2025-04-24 ENCOUNTER — EVALUATION (OUTPATIENT)
Dept: PHYSICAL THERAPY | Facility: CLINIC | Age: 75
End: 2025-04-24
Attending: ORTHOPAEDIC SURGERY
Payer: MEDICARE

## 2025-04-24 DIAGNOSIS — M17.12 PRIMARY OSTEOARTHRITIS OF LEFT KNEE: Primary | ICD-10-CM

## 2025-04-24 PROCEDURE — 97110 THERAPEUTIC EXERCISES: CPT | Performed by: PHYSICAL THERAPIST

## 2025-04-24 PROCEDURE — 97162 PT EVAL MOD COMPLEX 30 MIN: CPT | Performed by: PHYSICAL THERAPIST

## 2025-04-24 NOTE — LETTER
2025    Gavin Parks MD  [address] [place] [id number]    Patient: Charis Brizuela   YOB: 1950   Date of Visit: 2025     Encounter Diagnosis     ICD-10-CM    1. Primary osteoarthritis of left knee  M17.12           Dear Dr. Gavin Parks MD:    Thank you for your recent referral of Charis Brizuela. Please review the attached evaluation summary from Charis's recent visit.     Please verify that you agree with the plan of care by signing the attached order.     If you have any questions or concerns, please do not hesitate to call.     I sincerely appreciate the opportunity to share in the care of one of your patients and hope to have another opportunity to work with you in the near future.       Sincerely,    Christopher Del Castillo, PT      Referring Provider:      I certify that I have read the below Plan of Care and certify the need for these services furnished under this plan of treatment while under my care.                    Gavin Parks MD  [address] [place] [id number]  Via Fax: [phone]          PT Evaluation     Today's date: 2025  Patient name: Charis Brizuela  : 1950  MRN: [id number]  Referring provider: Gavin Parks MD  Dx:   Encounter Diagnosis     ICD-10-CM    1. Primary osteoarthritis of left knee  M17.12                      Assessment  Impairments: abnormal gait, activity intolerance, impaired balance, impaired physical strength, lacks appropriate home exercise program and pain with function  Symptom irritability: moderate    Assessment details: Charis Brizuela is a 74 y.o. female presenting to outpatient physical therapy with chief complaints of (L) Knee pain. Patient describes chronic (L) knee pain with declining function over the past few months. Patient displays with abnormal gait, good knee AROM, (L) hip strength deficits, balance deficits, and functional restrictions.  Patient's symptoms are multifactorial in nature with a primary movement  diagnosis of motor control resulting in pathoanatomical signs and symptoms consistent with Primary osteoarthritis of left knee  (primary encounter diagnosis) resulting in limitations in her ability to walk comfortably and perform a regular exercise program.  No further referral appears necessary at this time based upon examination results.    Please contact me if you have any questions. Thank you for the opportunity to share in the care of this patient.     Understanding of Dx/Px/POC: good     Prognosis: good  Prognosis details: Positive prognostic indicators include positive attitude toward recovery, motivated to improve, good understanding of condition, and realistic expectations.      Goals  STG to be met in 6 weeks:  - Increase (L) Knee AROM: 0-90 degrees.  - Increase (B) Hip and Knee strength by 1/2 MMT grade.  - Improve SL balance to 15 seconds.  - I with HEP.  - Patient will be able to return to walking without AD.   LTG to be met in 12 weeks:  - Increase (L) Knee AROM: 0-120 degrees.  - Increase (B) Hip and Knee strength to 5/5 all planes.  - Improve SL balance to 30 seconds.   - I with updated HEP.  - Patient will be able to return to regular exercise program.         Plan  Patient would benefit from: skilled physical therapy  Planned modality interventions: cryotherapy and thermotherapy: hydrocollator packs    Planned therapy interventions: joint mobilization, manual therapy, neuromuscular re-education, patient education, strengthening, stretching, therapeutic activities, therapeutic exercise, home exercise program, body mechanics training, activity modification, functional ROM exercises, gait training and balance    Plan of Care beginning date: 4/24/2025  Plan of Care expiration date: 7/17/2025  Treatment plan discussed with: patient  Plan details: Prognosis above is given PT services 2x/week tapering to 1x/week over the next 12 weeks and home program adherence.           Subjective Evaluation    History  of Present Illness  Mechanism of injury: At Evaluation (2025): Patient reports chronic (L) Knee pain for many years.  Over the years she has managed her symptoms with activity modification, medication, injections, and PT.  Her pain has worsened.  She is scheduled for (L) TKA for 25.    Red Flag Screen: Patient denies: fever, changes in bowel or bladder function, headache, dizziness, visual changes, nausea, vomiting, weakness, unexplained weight loss, numbness, tingling , pain with coughing/ sneezing, or traumatic SYLWIA.     Greatest concern:  recovering from surgery  Quality of life: good    Patient Goals  Patient goal: Patient Specific Functional Scale: return to walking with minimal pain 0/10, return to regular exercise program 0/10, recover fully from surgery 0/10; Total 0/30  Pain  Current pain ratin  At best pain ratin  At worst pain ratin  Location: (L) Knee  Quality: dull ache and sharp  Alleviating factors: Activity modification, essential oils, ice.  Exacerbated by: Standing for extended time, walking, sit to stand, stair negotiation.    Social Support  Steps to enter house: yes  Stairs in house: yes   Lives in: multiple-level home  Lives with: spouse    Employment status: not working    Diagnostic Tests  X-ray: abnormal  Treatments  Previous treatment: injection treatment, medication and physical therapy        Objective     Static Posture   General Observations  Symmetrical weight bearing.     Palpation     Additional Palpation Details  TTP throughout (L) knee    Active Range of Motion   Left Knee   Flexion: 120 degrees   Extension: 0 degrees     Right Knee   Flexion: 120 degrees   Extension: 0 degrees     Strength/Myotome Testing     Left Hip   Planes of Motion   Extension: 3+  Abduction: 3+    Right Hip   Planes of Motion   Extension: 4  Abduction: 4    Left Knee   Flexion: 5  Extension: 5    Right Knee   Flexion: 5  Extension: 5    Ambulation     Observational Gait   Gait:  antalgic   Decreased walking speed, left stance time and left step length.     Functional Assessment        Comments  Risk Assessment and Prediction Tool Score = 9    Car transfers [place] Rating: Independent  Timed Up and Go without straight cane = 8.8 seconds              Daily Treatment Diary     DX: (L) Knee OA  Follow Up with Referring Provider:   Precautions: NA  CO-MORBIDITIES: Hx of AAA, (R) TKA (2023)    POC Expires Reeval for Medicare to be completed Unit Limit Auth Expiration Date PT/OT/STVisit Limit   7/17/25 By visit 10  NA 12/31/25 BOMN    Completed on visit                  Stage: Chronic   Etiology: Degenerative  Stability of Symptoms:  At Evaluation: Stable - unchanged  Global Rating of Change:   Symptom Irritability Level: Moderate  Primary Movement System Diagnosis: Motor Control  Primary Pain Phenotype: Nociceptive  Patient Specific Functional Scale:   4/24/25: return to walking with minimal pain 0/10, return to regular exercise program 0/10, recover fully from surgery 0/10; Total 0/30   Patient Acceptable Symptom State: unacceptable  FOTO Prediction: TBD by visit TBD  FOTO Progress: TBD at evaluation   Greatest Concern: recovering from surgery   Current Activity Plan: added to HEP (4/24)  Current Educational Needs: appropriate activity modifications, appropriate activity progressions, realistic expectations, timeframe for recovery, next steps to take to progress towards goals        TREATMENT DIARY  Auth Status DATE 4/24        NA Visit # 1         Remaining         MANUAL THERAPY         (L) Knee PROM                                                      THERAPEUTIC EXERCISE HEP         Recumbent Bike                    Ankle Pump          Supine Quad Set          Supine Heel Slide          Seated Quad Set          Seated Heel Slide          Quad Set                    Long Sit Calf Stretch                    SLR          S/L Hip ABD          Bridge          LAQ                                         NEUROMUSCULAR REEDUCATION           SL Balance          Standing Hip Flex           Standing Hip ABD          Standing Hip Ext          Standing HS Curl                    TB TKE                    FWD Mini Lunge          LAT Mini Lunge          Mini Squat                    FWD Step Up          LAT Step Up                    THERAPEUTIC ACTIVITY                                                  GAIT TRAINING                                                  MODALITIES

## 2025-04-24 NOTE — PROGRESS NOTES
PT Evaluation     Today's date: 2025  Patient name: Charis Brizuela  : 1950  MRN: [id number]  Referring provider: Gavin Parks MD  Dx:   Encounter Diagnosis     ICD-10-CM    1. Primary osteoarthritis of left knee  M17.12                      Assessment  Impairments: abnormal gait, activity intolerance, impaired balance, impaired physical strength, lacks appropriate home exercise program and pain with function  Symptom irritability: moderate    Assessment details: Charis Brizuela is a 74 y.o. female presenting to outpatient physical therapy with chief complaints of (L) Knee pain. Patient describes chronic (L) knee pain with declining function over the past few months. Patient displays with abnormal gait, good knee AROM, (L) hip strength deficits, balance deficits, and functional restrictions.  Patient's symptoms are multifactorial in nature with a primary movement diagnosis of motor control resulting in pathoanatomical signs and symptoms consistent with Primary osteoarthritis of left knee  (primary encounter diagnosis) resulting in limitations in her ability to walk comfortably and perform a regular exercise program.  No further referral appears necessary at this time based upon examination results.    Please contact me if you have any questions. Thank you for the opportunity to share in the care of this patient.     Understanding of Dx/Px/POC: good     Prognosis: good  Prognosis details: Positive prognostic indicators include positive attitude toward recovery, motivated to improve, good understanding of condition, and realistic expectations.      Goals  STG to be met in 6 weeks:  - Increase (L) Knee AROM: 0-90 degrees.  - Increase (B) Hip and Knee strength by 1/2 MMT grade.  - Improve SL balance to 15 seconds.  - I with HEP.  - Patient will be able to return to walking without AD.   LTG to be met in 12 weeks:  - Increase (L) Knee AROM: 0-120 degrees.  - Increase (B) Hip and Knee strength to 5/5 all  planes.  - Improve SL balance to 30 seconds.   - I with updated HEP.  - Patient will be able to return to regular exercise program.         Plan  Patient would benefit from: skilled physical therapy  Planned modality interventions: cryotherapy and thermotherapy: hydrocollator packs    Planned therapy interventions: joint mobilization, manual therapy, neuromuscular re-education, patient education, strengthening, stretching, therapeutic activities, therapeutic exercise, home exercise program, body mechanics training, activity modification, functional ROM exercises, gait training and balance    Plan of Care beginning date: 2025  Plan of Care expiration date: 2025  Treatment plan discussed with: patient  Plan details: Prognosis above is given PT services 2x/week tapering to 1x/week over the next 12 weeks and home program adherence.           Subjective Evaluation    History of Present Illness  Mechanism of injury: At Evaluation (2025): Patient reports chronic (L) Knee pain for many years.  Over the years she has managed her symptoms with activity modification, medication, injections, and PT.  Her pain has worsened.  She is scheduled for (L) TKA for 25.    Red Flag Screen: Patient denies: fever, changes in bowel or bladder function, headache, dizziness, visual changes, nausea, vomiting, weakness, unexplained weight loss, numbness, tingling , pain with coughing/ sneezing, or traumatic SYLWIA.     Greatest concern:  recovering from surgery  Quality of life: good    Patient Goals  Patient goal: Patient Specific Functional Scale: return to walking with minimal pain 0/10, return to regular exercise program 0/10, recover fully from surgery 0/10; Total 0/30  Pain  Current pain ratin  At best pain ratin  At worst pain ratin  Location: (L) Knee  Quality: dull ache and sharp  Alleviating factors: Activity modification, essential oils, ice.  Exacerbated by: Standing for extended time, walking, sit  to stand, stair negotiation.    Social Support  Steps to enter house: yes  Stairs in house: yes   Lives in: multiple-level home  Lives with: spouse    Employment status: not working    Diagnostic Tests  X-ray: abnormal  Treatments  Previous treatment: injection treatment, medication and physical therapy        Objective     Static Posture   General Observations  Symmetrical weight bearing.     Palpation     Additional Palpation Details  TTP throughout (L) knee    Active Range of Motion   Left Knee   Flexion: 120 degrees   Extension: 0 degrees     Right Knee   Flexion: 120 degrees   Extension: 0 degrees     Strength/Myotome Testing     Left Hip   Planes of Motion   Extension: 3+  Abduction: 3+    Right Hip   Planes of Motion   Extension: 4  Abduction: 4    Left Knee   Flexion: 5  Extension: 5    Right Knee   Flexion: 5  Extension: 5    Ambulation     Observational Gait   Gait: antalgic   Decreased walking speed, left stance time and left step length.     Functional Assessment        Comments  Risk Assessment and Prediction Tool Score = 9    Car transfers [place] Rating: Independent  Timed Up and Go without straight cane = 8.8 seconds              Daily Treatment Diary     DX: (L) Knee OA  Follow Up with Referring Provider:   Precautions: NA  CO-MORBIDITIES: Hx of AAA, (R) TKA (2023)    POC Expires Reeval for Medicare to be completed Unit Limit Auth Expiration Date PT/OT/STVisit Limit   7/17/25 By visit 10  NA 12/31/25 BOMN    Completed on visit                  Stage: Chronic   Etiology: Degenerative  Stability of Symptoms:  At Evaluation: Stable - unchanged  Global Rating of Change:   Symptom Irritability Level: Moderate  Primary Movement System Diagnosis: Motor Control  Primary Pain Phenotype: Nociceptive  Patient Specific Functional Scale:   4/24/25: return to walking with minimal pain 0/10, return to regular exercise program 0/10, recover fully from surgery 0/10; Total 0/30   Patient Acceptable Symptom  State: unacceptable  FOTO Prediction: TBD by visit TBD  FOTO Progress: TBD at evaluation   Greatest Concern: recovering from surgery   Current Activity Plan: added to HEP (4/24)  Current Educational Needs: appropriate activity modifications, appropriate activity progressions, realistic expectations, timeframe for recovery, next steps to take to progress towards goals        TREATMENT DIARY  Auth Status DATE 4/24        NA Visit # 1         Remaining         MANUAL THERAPY         (L) Knee PROM                                                      THERAPEUTIC EXERCISE HEP         Recumbent Bike                    Ankle Pump          Supine Quad Set          Supine Heel Slide          Seated Quad Set          Seated Heel Slide          Quad Set                    Long Sit Calf Stretch                    SLR          S/L Hip ABD          Bridge          LAQ                                        NEUROMUSCULAR REEDUCATION           SL Balance          Standing Hip Flex           Standing Hip ABD          Standing Hip Ext          Standing HS Curl                    TB TKE                    FWD Mini Lunge          LAT Mini Lunge          Mini Squat                    FWD Step Up          LAT Step Up                    THERAPEUTIC ACTIVITY                                                  GAIT TRAINING                                                  MODALITIES

## 2025-04-24 NOTE — HOME EXERCISE EDUCATION
Program_ID:[id number]   Access Code: [email or web address]  URL: [email or web address]  Date: 04-  Prepared By: Christopher Del Castillo    Program Notes      Exercises      - Supine Active Straight Leg Raise - 1 x daily -  x weekly - 2 sets - 10 reps      - Sidelying Hip Abduction - 1 x daily -  x weekly - 2 sets - 10 reps      - Supine Bridge - 1 x daily -  x weekly - 2 sets - 10 reps - 5 second hold      - Prone Hip Extension - 1 x daily -  x weekly - 2 sets - 10 reps    Patient Education      - Clinic Information       - Your Next Appointment      - Understanding Pain

## 2025-06-20 ENCOUNTER — OFFICE VISIT (OUTPATIENT)
Dept: PHYSICAL THERAPY | Facility: CLINIC | Age: 75
End: 2025-06-20
Attending: ORTHOPAEDIC SURGERY
Payer: MEDICARE

## 2025-06-20 DIAGNOSIS — Z96.652 S/P TKR (TOTAL KNEE REPLACEMENT), LEFT: ICD-10-CM

## 2025-06-20 DIAGNOSIS — M17.12 PRIMARY OSTEOARTHRITIS OF LEFT KNEE: Primary | ICD-10-CM

## 2025-06-20 PROCEDURE — 97110 THERAPEUTIC EXERCISES: CPT | Performed by: PHYSICAL THERAPIST

## 2025-06-20 PROCEDURE — 97164 PT RE-EVAL EST PLAN CARE: CPT | Performed by: PHYSICAL THERAPIST

## 2025-06-20 NOTE — HOME EXERCISE EDUCATION
Program_ID:[id number]   Access Code: [email or web address]  URL: [email or web address]  Date: 06-  Prepared By: Christopher Del Castillo    Program Notes      Exercises      - Supine Ankle Pumps - 8 x daily -  x weekly - 2 sets - 10 reps      - Long Sitting Calf Stretch with Strap - 3 x daily -  x weekly -  sets - 5 reps - 30 second hold      - Seated Heel Slide - 3 x daily -  x weekly -  sets - 5 reps - 30 second hold      - Supine Gluteal Sets - 3 x daily -  x weekly - 2 sets - 10 reps      - Supine Quad Set - 3 x daily -  x weekly - 2 sets - 10 reps    Patient Education      - Clinic Information       - Your Next Appointment      - Understanding Pain

## 2025-06-20 NOTE — PROGRESS NOTES
PT Re-Evaluation     Today's date: 2025  Patient name: Charis Brizuela  : 1950  MRN: [id number]  Referring provider: Gavin Parks MD  Dx:   Encounter Diagnosis     ICD-10-CM    1. Primary osteoarthritis of left knee  M17.12       2. S/P TKR (total knee replacement), left  Z96.652                      Assessment  Impairments: abnormal gait, activity intolerance, impaired balance, impaired physical strength, lacks appropriate home exercise program and pain with function  Symptom irritability: moderate    Assessment details: Charis Brizuela is a 74 y.o. female presenting to outpatient physical therapy s/p (L) TKA. Patient describes chronic (L) knee pain with declining function over the past few months. Patient displays with abnormal gait, (L) Knee AROM restrictions, (L) Knee and Hip strength deficits, balance deficits, and functional restrictions.  Patient's symptoms are multifactorial in nature with a primary movement diagnosis of motor control resulting in pathoanatomical signs and symptoms consistent with Primary osteoarthritis of left knee  (primary encounter diagnosis), s/p (L) TKA resulting in limitations in her ability to walk comfortably and perform a regular exercise program.  No further referral appears necessary at this time based upon examination results.    Please contact me if you have any questions. Thank you for the opportunity to share in the care of this patient.     Understanding of Dx/Px/POC: good     Prognosis: good  Prognosis details: Positive prognostic indicators include positive attitude toward recovery, motivated to improve, good understanding of condition, and realistic expectations.      Goals  STG to be met in 6 weeks:  - Increase (L) Knee AROM: 0-90 degrees.  - Increase (B) Hip and Knee strength by 1/2 MMT grade.  - Improve SL balance to 15 seconds.  - I with HEP.  - Patient will be able to return to walking without AD.   LTG to be met in 12 weeks:  - Increase (L) Knee AROM:  0-120 degrees.  - Increase (B) Hip and Knee strength to 5/5 all planes.  - Improve SL balance to 30 seconds.   - I with updated HEP.  - Patient will be able to return to regular exercise program.         Plan  Patient would benefit from: skilled physical therapy  Planned modality interventions: cryotherapy and thermotherapy: hydrocollator packs    Planned therapy interventions: joint mobilization, manual therapy, neuromuscular re-education, patient education, strengthening, stretching, therapeutic activities, therapeutic exercise, home exercise program, body mechanics training, activity modification, functional ROM exercises, gait training and balance    Plan of Care beginning date: 2025  Plan of Care expiration date: 2025  Treatment plan discussed with: patient  Plan details: Prognosis above is given PT services 2x/week tapering to 1x/week over the next 12 weeks and home program adherence.           Subjective Evaluation    History of Present Illness  Date of surgery: 2025  Mechanism of injury: surgery  Mechanism of injury: At Evaluation (2025): Patient reports chronic (L) Knee pain for many years.  Over the years she has managed her symptoms with activity modification, medication, injections, and PT.  Her pain has worsened.  She is scheduled for (L) TKA for 25.    Red Flag Screen: Patient denies: fever, changes in bowel or bladder function, headache, dizziness, visual changes, nausea, vomiting, weakness, unexplained weight loss, numbness, tingling , pain with coughing/ sneezing, or traumatic SYLWIA.     Greatest concern:  recovering from surgery    (25): Patient underwent (L) TKA on 25.  She was discharged to home the same day  Quality of life: good    Patient Goals  Patient goal: Patient Specific Functional Scale: return to walking with minimal pain 0/10, return to regular exercise program 0/10, recover fully from surgery 0/10; Total 0/30  Pain  Current pain ratin  At best  pain rating: 3  At worst pain ratin  Location: (L) Knee  Quality: dull ache and sharp  Alleviating factors: Activity modification, essential oils, ice.  Exacerbated by: Standing for extended time, walking, sit to stand, stair negotiation.    Social Support  Steps to enter house: yes  Stairs in house: yes   Lives in: multiple-level home  Lives with: spouse    Employment status: not working    Diagnostic Tests  X-ray: abnormal  Treatments  Previous treatment: injection treatment, medication and physical therapy      Objective     Static Posture   General Observations  Asymmetrical weight bearing and shifted right.     Observations     Additional Observation Details  Aquacel intact - no evidence of drainage     Palpation     Additional Palpation Details  TTP throughout (L) knee    Active Range of Motion   Left Knee   Flexion: 50 degrees with pain  Extension: 0 degrees     Right Knee   Flexion: 120 degrees   Extension: 0 degrees     Strength/Myotome Testing     Left Hip   Planes of Motion   Extension: 3+  Abduction: 3+    Right Hip   Planes of Motion   Extension: 4  Abduction: 4    Left Knee   Flexion: 3+  Extension: 3+    Right Knee   Flexion: 5  Extension: 5    Swelling     Left Knee Girth Measurement (cm)   Joint line: 50 cm    Right Knee Girth Measurement (cm)   Joint line: 47 cm    Ambulation     Observational Gait   Gait: antalgic   Decreased walking speed, left stance time and left step length.     Additional Observational Gait Details  FW Walker    Functional Assessment        Comments  Timed Up and Go with FW Walker = 26.2 seconds            Daily Treatment Diary     DX: (L) Knee OA  Follow Up with Referring Provider:   Precautions: NA  CO-MORBIDITIES: Hx of AAA, (R) TKA ()    POC Expires Reeval for Medicare to be completed Unit Limit Auth Expiration Date PT/OT/STVisit Limit   25 By visit 10  NA 25 BOMN    Completed on visit                  Stage: Chronic   Etiology: Degenerative  Stability  of Symptoms:  At Evaluation: Stable - unchanged  Global Rating of Change:   Symptom Irritability Level: Moderate  Primary Movement System Diagnosis: Motor Control  Primary Pain Phenotype: Nociceptive  Patient Specific Functional Scale:   4/24/25: return to walking with minimal pain 0/10, return to regular exercise program 0/10, recover fully from surgery 0/10; Total 0/30   Patient Acceptable Symptom State: unacceptable  FOTO Prediction: TBD by visit TBD  FOTO Progress: TBD at evaluation   Greatest Concern: recovering from surgery   Current Activity Plan: added to HEP (4/24)  Current Educational Needs: appropriate activity modifications, appropriate activity progressions, realistic expectations, timeframe for recovery, next steps to take to progress towards goals        TREATMENT DIARY  Auth Status DATE 6/20        NA Visit # 1         Remaining         MANUAL THERAPY         (L) Knee PROM                                                      THERAPEUTIC EXERCISE HEP         Recumbent Bike                    Ankle Pump 6/20                             Seated Quad Set 6/20         Seated Heel Slide 6/20         Quad Set 6/20         Glute Set 6/20         Long Sit Calf Stretch 6/20                   SLR          S/L Hip ABD          Bridge          LAQ                                        NEUROMUSCULAR REEDUCATION           SL Balance          Standing Hip Flex           Standing Hip ABD          Standing Hip Ext          Standing HS Curl                    TB TKE                    FWD Mini Lunge          LAT Mini Lunge          Mini Squat                    FWD Step Up          LAT Step Up                    THERAPEUTIC ACTIVITY                                                  GAIT TRAINING                                                  MODALITIES

## 2025-06-20 NOTE — LETTER
2025    Gavin Parks MD  [address] [place] [id number]    Patient: Charis Brizuela   YOB: 1950   Date of Visit: 2025     Encounter Diagnosis     ICD-10-CM    1. Primary osteoarthritis of left knee  M17.12       2. S/P TKR (total knee replacement), left  Z96.652           Dear Dr. Gavin Parks MD:    Thank you for your recent referral of Charis Brizuela. Please review the attached evaluation summary from Charis's recent visit.     Please verify that you agree with the plan of care by signing the attached order.     If you have any questions or concerns, please do not hesitate to call.     I sincerely appreciate the opportunity to share in the care of one of your patients and hope to have another opportunity to work with you in the near future.       Sincerely,    Christopher Del Castillo, PT      Referring Provider:      I certify that I have read the below Plan of Care and certify the need for these services furnished under this plan of treatment while under my care.                    Gavin Parks MD  [address] [place] [id number]  Via Fax: [phone]          PT Re-Evaluation     Today's date: 2025  Patient name: Charis Brizuela  : 1950  MRN: [id number]  Referring provider: Gavin Parks MD  Dx:   Encounter Diagnosis     ICD-10-CM    1. Primary osteoarthritis of left knee  M17.12       2. S/P TKR (total knee replacement), left  Z96.652                      Assessment  Impairments: abnormal gait, activity intolerance, impaired balance, impaired physical strength, lacks appropriate home exercise program and pain with function  Symptom irritability: moderate    Assessment details: Charis Brizuela is a 74 y.o. female presenting to outpatient physical therapy s/p (L) TKA. Patient describes chronic (L) knee pain with declining function over the past few months. Patient displays with abnormal gait, (L) Knee AROM restrictions, (L) Knee and Hip strength deficits, balance  deficits, and functional restrictions.  Patient's symptoms are multifactorial in nature with a primary movement diagnosis of motor control resulting in pathoanatomical signs and symptoms consistent with Primary osteoarthritis of left knee  (primary encounter diagnosis), s/p (L) TKA resulting in limitations in her ability to walk comfortably and perform a regular exercise program.  No further referral appears necessary at this time based upon examination results.    Please contact me if you have any questions. Thank you for the opportunity to share in the care of this patient.     Understanding of Dx/Px/POC: good     Prognosis: good  Prognosis details: Positive prognostic indicators include positive attitude toward recovery, motivated to improve, good understanding of condition, and realistic expectations.      Goals  STG to be met in 6 weeks:  - Increase (L) Knee AROM: 0-90 degrees.  - Increase (B) Hip and Knee strength by 1/2 MMT grade.  - Improve SL balance to 15 seconds.  - I with HEP.  - Patient will be able to return to walking without AD.   LTG to be met in 12 weeks:  - Increase (L) Knee AROM: 0-120 degrees.  - Increase (B) Hip and Knee strength to 5/5 all planes.  - Improve SL balance to 30 seconds.   - I with updated HEP.  - Patient will be able to return to regular exercise program.         Plan  Patient would benefit from: skilled physical therapy  Planned modality interventions: cryotherapy and thermotherapy: hydrocollator packs    Planned therapy interventions: joint mobilization, manual therapy, neuromuscular re-education, patient education, strengthening, stretching, therapeutic activities, therapeutic exercise, home exercise program, body mechanics training, activity modification, functional ROM exercises, gait training and balance    Plan of Care beginning date: 6/20/2025  Plan of Care expiration date: 9/12/2025  Treatment plan discussed with: patient  Plan details: Prognosis above is given PT  services 2x/week tapering to 1x/week over the next 12 weeks and home program adherence.           Subjective Evaluation    History of Present Illness  Date of surgery: 2025  Mechanism of injury: surgery  Mechanism of injury: At Evaluation (2025): Patient reports chronic (L) Knee pain for many years.  Over the years she has managed her symptoms with activity modification, medication, injections, and PT.  Her pain has worsened.  She is scheduled for (L) TKA for 25.    Red Flag Screen: Patient denies: fever, changes in bowel or bladder function, headache, dizziness, visual changes, nausea, vomiting, weakness, unexplained weight loss, numbness, tingling , pain with coughing/ sneezing, or traumatic SYLWIA.     Greatest concern:  recovering from surgery    (25): Patient underwent (L) TKA on 25.  She was discharged to home the same day  Quality of life: good    Patient Goals  Patient goal: Patient Specific Functional Scale: return to walking with minimal pain 0/10, return to regular exercise program 0/10, recover fully from surgery 0/10; Total 0/30  Pain  Current pain ratin  At best pain rating: 3  At worst pain ratin  Location: (L) Knee  Quality: dull ache and sharp  Alleviating factors: Activity modification, essential oils, ice.  Exacerbated by: Standing for extended time, walking, sit to stand, stair negotiation.    Social Support  Steps to enter house: yes  Stairs in house: yes   Lives in: multiple-level home  Lives with: spouse    Employment status: not working    Diagnostic Tests  X-ray: abnormal  Treatments  Previous treatment: injection treatment, medication and physical therapy      Objective     Static Posture   General Observations  Asymmetrical weight bearing and shifted right.     Observations     Additional Observation Details  Aquacel intact - no evidence of drainage     Palpation     Additional Palpation Details  TTP throughout (L) knee    Active Range of Motion   Left  Knee   Flexion: 50 degrees with pain  Extension: 0 degrees     Right Knee   Flexion: 120 degrees   Extension: 0 degrees     Strength/Myotome Testing     Left Hip   Planes of Motion   Extension: 3+  Abduction: 3+    Right Hip   Planes of Motion   Extension: 4  Abduction: 4    Left Knee   Flexion: 3+  Extension: 3+    Right Knee   Flexion: 5  Extension: 5    Swelling     Left Knee Girth Measurement (cm)   Joint line: 50 cm    Right Knee Girth Measurement (cm)   Joint line: 47 cm    Ambulation     Observational Gait   Gait: antalgic   Decreased walking speed, left stance time and left step length.     Additional Observational Gait Details  FW Walker    Functional Assessment        Comments  Timed Up and Go with FW Walker = 26.2 seconds            Daily Treatment Diary     DX: (L) Knee OA  Follow Up with Referring Provider:   Precautions: NA  CO-MORBIDITIES: Hx of AAA, (R) TKA (2023)    POC Expires Reeval for Medicare to be completed Unit Limit Auth Expiration Date PT/OT/STVisit Limit   9/12/25 By visit 10  NA 12/31/25 BOMN    Completed on visit                  Stage: Chronic   Etiology: Degenerative  Stability of Symptoms:  At Evaluation: Stable - unchanged  Global Rating of Change:   Symptom Irritability Level: Moderate  Primary Movement System Diagnosis: Motor Control  Primary Pain Phenotype: Nociceptive  Patient Specific Functional Scale:   4/24/25: return to walking with minimal pain 0/10, return to regular exercise program 0/10, recover fully from surgery 0/10; Total 0/30   Patient Acceptable Symptom State: unacceptable  FOTO Prediction: TBD by visit TBD  FOTO Progress: TBD at evaluation   Greatest Concern: recovering from surgery   Current Activity Plan: added to HEP (4/24)  Current Educational Needs: appropriate activity modifications, appropriate activity progressions, realistic expectations, timeframe for recovery, next steps to take to progress towards goals        TREATMENT DIARY  Auth Status DATE 6/20         NA Visit # 1         Remaining         MANUAL THERAPY         (L) Knee PROM                                                      THERAPEUTIC EXERCISE HEP         Recumbent Bike                    Ankle Pump 6/20                             Seated Quad Set 6/20         Seated Heel Slide 6/20         Quad Set 6/20         Glute Set 6/20         Long Sit Calf Stretch 6/20                   SLR          S/L Hip ABD          Bridge          LAQ                                        NEUROMUSCULAR REEDUCATION           SL Balance          Standing Hip Flex           Standing Hip ABD          Standing Hip Ext          Standing HS Curl                    TB TKE                    FWD Mini Lunge          LAT Mini Lunge          Mini Squat                    FWD Step Up          LAT Step Up                    THERAPEUTIC ACTIVITY                                                  GAIT TRAINING                                                  MODALITIES

## 2025-06-24 ENCOUNTER — OFFICE VISIT (OUTPATIENT)
Dept: PHYSICAL THERAPY | Facility: CLINIC | Age: 75
End: 2025-06-24
Attending: ORTHOPAEDIC SURGERY
Payer: MEDICARE

## 2025-06-24 DIAGNOSIS — Z96.652 S/P TKR (TOTAL KNEE REPLACEMENT), LEFT: ICD-10-CM

## 2025-06-24 DIAGNOSIS — M17.12 PRIMARY OSTEOARTHRITIS OF LEFT KNEE: Primary | ICD-10-CM

## 2025-06-24 PROCEDURE — 97140 MANUAL THERAPY 1/> REGIONS: CPT | Performed by: PHYSICAL THERAPIST

## 2025-06-24 PROCEDURE — 97110 THERAPEUTIC EXERCISES: CPT | Performed by: PHYSICAL THERAPIST

## 2025-06-24 NOTE — PROGRESS NOTES
Daily Note     Today's date: 2025  Patient name: Charis Brizuela  : 1950  MRN: [id number]  Referring provider: Gavin Parks MD  Dx:   Encounter Diagnosis     ICD-10-CM    1. Primary osteoarthritis of left knee  M17.12       2. S/P TKR (total knee replacement), left  Z96.652                      Subjective:   Current Status: feeling very swollen today - pain levels are improving  New Symptoms/ Problems: increased swelling  Response to Last Treatment: no adverse reaction to LV  HEP/ Activity Recommendations:  performing regularly with exception of ankle pumps with ankle elevated above heart  Progress Towards Goals: pain levels are improving    Objective: See treatment diary below      Assessment:   Stage: Chronic   Etiology: Degenerative  Stability of Symptoms:  At Evaluation: Stable - unchanged  Global Rating of Change:   Symptom Irritability Level: Moderate  Primary Movement System Diagnosis: Motor Control  Primary Pain Phenotype: Nociceptive  Patient Specific Functional Scale:   25: return to walking with minimal pain 0/10, return to regular exercise program 0/10, recover fully from surgery 0/10; Total 0/30   Patient Acceptable Symptom State: unacceptable  FOTO Prediction: 71 by visit 18  FOTO Progress: 36 at evaluation   Greatest Concern: recovering from surgery   Current Activity Plan: added to HEP ()  Current Educational Needs: appropriate activity modifications, appropriate activity progressions, realistic expectations, timeframe for recovery, next steps to take to progress towards goals    Patient had good tolerance to manual stretching.  Bandage was removed - steristrips in tact.  She was able to perform SLR exercise without assistance today.  Discussed leg elevation above heart to maximize swelling management.  Skilled PT continues to be required to guide progression of knee mobility and strength to allow her to return to performing daily home tasks without assistance.       Plan:  "Continue with POC.  Monitor tolerance to last treatment and activity recommendations.  Follow up with me on 6/27.  Continue progressing knee mobility with regular stretching and swelling control.          Daily Treatment Diary     DX: (L) Knee OA  Follow Up with Referring Provider:   Precautions: NA  CO-MORBIDITIES: Hx of AAA, (R) TKA (2023)    POC Expires Reeval for Medicare to be completed Unit Limit Auth Expiration Date PT/OT/STVisit Limit   9/12/25 By visit 10  NA 12/31/25 BOMN    Completed on visit                          TREATMENT DIARY  Auth Status DATE 6/24        NA Visit # 2         Remaining         MANUAL THERAPY         (L) Knee PROM - Seated Flex 8 min                                                     THERAPEUTIC EXERCISE HEP         Recumbent Bike  ROM  6 min                  Ankle Pump 6/20                                       Seated Heel Slide 6/20 5\"x20        Quad Set 6/20 5\"x15        Glute Set 6/20         Long Sit Calf Stretch 6/20                   SLR  Ind  20x        S/L Hip ABD          Bridge          LAQ                                        NEUROMUSCULAR REEDUCATION           SL Balance          Standing Hip Flex           Standing Hip ABD          Standing Hip Ext          Standing HS Curl                    TB TKE                    FWD Mini Lunge          LAT Mini Lunge          Mini Squat                    FWD Step Up          LAT Step Up                    THERAPEUTIC ACTIVITY                                                  GAIT TRAINING                                                  MODALITIES                                        "

## 2025-06-27 ENCOUNTER — OFFICE VISIT (OUTPATIENT)
Dept: PHYSICAL THERAPY | Facility: CLINIC | Age: 75
End: 2025-06-27
Attending: ORTHOPAEDIC SURGERY
Payer: MEDICARE

## 2025-06-27 DIAGNOSIS — M17.12 PRIMARY OSTEOARTHRITIS OF LEFT KNEE: Primary | ICD-10-CM

## 2025-06-27 DIAGNOSIS — Z96.652 S/P TKR (TOTAL KNEE REPLACEMENT), LEFT: ICD-10-CM

## 2025-06-27 PROCEDURE — 97112 NEUROMUSCULAR REEDUCATION: CPT | Performed by: PHYSICAL THERAPIST

## 2025-06-27 PROCEDURE — 97110 THERAPEUTIC EXERCISES: CPT | Performed by: PHYSICAL THERAPIST

## 2025-06-27 NOTE — PROGRESS NOTES
Daily Note     Today's date: 2025  Patient name: Charis Brizuela  : 1950  MRN: [id number]  Referring provider: Gavin Parks MD  Dx:   Encounter Diagnosis     ICD-10-CM    1. Primary osteoarthritis of left knee  M17.12       2. S/P TKR (total knee replacement), left  Z96.652                      Subjective:   Current Status: feeling very painful today - swelling is improving   New Symptoms/ Problems: increased swelling  Response to Last Treatment: no adverse reaction to LV  HEP/ Activity Recommendations:  performing regularly with exception of ankle pumps with ankle elevated above heart  Progress Towards Goals: pain levels are improving    Objective: See treatment diary below      Assessment:   Stage: Chronic   Etiology: Degenerative  Stability of Symptoms:  At Evaluation: Stable - unchanged  Global Rating of Change:   Symptom Irritability Level: Moderate  Primary Movement System Diagnosis: Motor Control  Primary Pain Phenotype: Nociceptive  Patient Specific Functional Scale:   25: return to walking with minimal pain 0/10, return to regular exercise program 0/10, recover fully from surgery 0/10; Total 0/30   Patient Acceptable Symptom State: unacceptable  FOTO Prediction: 71 by visit 18  FOTO Progress: 36 at evaluation   Greatest Concern: recovering from surgery   Current Activity Plan: added to HEP ()  Current Educational Needs: appropriate activity modifications, appropriate activity progressions, realistic expectations, timeframe for recovery, next steps to take to progress towards goals    Patient had good tolerance to manual stretching - achieved 0-90 degrees knee ROM today.  She tolerated progressions with WB exercises well - fatigued post treatment.  Skilled PT continues to be required to guide progression of knee mobility and strength to allow her to return to performing daily home tasks without assistance.       Plan: Continue with POC.  Monitor tolerance to last treatment and activity  "recommendations.  Follow up with me on 7/1/25.  Continue progressing knee mobility with regular stretching and swelling control.          Daily Treatment Diary     DX: (L) Knee OA  Follow Up with Referring Provider:   Precautions: NA  CO-MORBIDITIES: Hx of AAA, (R) TKA (2023)    POC Expires Reeval for Medicare to be completed Unit Limit Auth Expiration Date PT/OT/STVisit Limit   9/12/25 By visit 10  NA 12/31/25 BOMN    Completed on visit                          TREATMENT DIARY  Auth Status DATE 6/24 6/27       NA Visit # 2 3        Remaining         MANUAL THERAPY         (L) Knee PROM - Seated Flex 8 min 8 min                                                    THERAPEUTIC EXERCISE HEP         Recumbent Bike  ROM  6 min ROM  6 min                 Ankle Pump 6/20                                       Seated Heel Slide 6/20 5\"x20        Quad Set 6/20 5\"x15        Glute Set 6/20         Long Sit Calf Stretch 6/20                   SLR  Ind  20x 20x       S/L Hip ABD          Bridge          LAQ   2#   20x                                     NEUROMUSCULAR REEDUCATION           SL Balance          Standing Hip Flex    15x ea       Standing Hip ABD   15x ea       Standing Hip Ext   15x ea       Standing HS Curl                    TB TKE                    FWD Mini Lunge          LAT Mini Lunge          Mini Squat                    FWD Step Up          LAT Step Up                    THERAPEUTIC ACTIVITY                                                  GAIT TRAINING                                                  MODALITIES                                        "

## 2025-07-01 ENCOUNTER — OFFICE VISIT (OUTPATIENT)
Dept: PHYSICAL THERAPY | Facility: CLINIC | Age: 75
End: 2025-07-01
Attending: ORTHOPAEDIC SURGERY
Payer: MEDICARE

## 2025-07-01 DIAGNOSIS — M17.12 PRIMARY OSTEOARTHRITIS OF LEFT KNEE: Primary | ICD-10-CM

## 2025-07-01 DIAGNOSIS — Z96.652 S/P TKR (TOTAL KNEE REPLACEMENT), LEFT: ICD-10-CM

## 2025-07-01 PROCEDURE — 97112 NEUROMUSCULAR REEDUCATION: CPT | Performed by: PHYSICAL THERAPIST

## 2025-07-01 PROCEDURE — 97110 THERAPEUTIC EXERCISES: CPT | Performed by: PHYSICAL THERAPIST

## 2025-07-01 NOTE — PROGRESS NOTES
Daily Note     Today's date: 2025  Patient name: Charis Brizuela  : 1950  MRN: [id number]  Referring provider: Gavin Parks MD  Dx:   Encounter Diagnosis     ICD-10-CM    1. Primary osteoarthritis of left knee  M17.12       2. S/P TKR (total knee replacement), left  Z96.652                      Subjective:   Current Status: pain persists but swelling is improving - had less energy yesterday - took a few naps  New Symptoms/ Problems: NA  Response to Last Treatment: no adverse reaction to LV  HEP/ Activity Recommendations:  performing regularly   Progress Towards Goals: pain levels are improving as well as swelling     Objective: See treatment diary below      Assessment:   Stage: Chronic   Etiology: Degenerative  Stability of Symptoms:  At Evaluation: Stable - unchanged  Global Rating of Change:   Symptom Irritability Level: Moderate  Primary Movement System Diagnosis: Motor Control  Primary Pain Phenotype: Nociceptive  Patient Specific Functional Scale:   25: return to walking with minimal pain 0/10, return to regular exercise program 0/10, recover fully from surgery 0/10; Total 0/30   Patient Acceptable Symptom State: unacceptable  FOTO Prediction: 71 by visit 18  FOTO Progress: 36 at evaluation   Greatest Concern: recovering from surgery   Current Activity Plan: added to HEP ()  Current Educational Needs: appropriate activity modifications, appropriate activity progressions, realistic expectations, timeframe for recovery, next steps to take to progress towards goals    Patient continues to progress well with manual stretching - achieved over 90 degrees of flexion today.  She continues to tolerate progressions well - fatigued with mini lunges - particularly lateral.  Skilled PT continues to be required to guide progression of knee mobility and strength to allow her to return to performing daily home tasks without assistance.       Plan: Continue with POC.  Monitor tolerance to last treatment and  "activity recommendations.  Follow up with me on 7/3/25.  Continue progressing knee mobility with regular stretching and swelling control.          Daily Treatment Diary     DX: (L) Knee OA  Follow Up with Referring Provider:   Precautions: NA  CO-MORBIDITIES: Hx of AAA, (R) TKA (2023)    POC Expires Reeval for Medicare to be completed Unit Limit Auth Expiration Date PT/OT/STVisit Limit   9/12/25 By visit 10  NA 12/31/25 BOMN    Completed on visit                          TREATMENT DIARY  Auth Status DATE 6/24 6/27 7/1      NA Visit # 2 3 4       Remaining         MANUAL THERAPY         (L) Knee PROM - Seated Flex 8 min 8 min 8 min                                                   THERAPEUTIC EXERCISE HEP         Recumbent Bike  ROM  6 min ROM  6 min ROM  6 min                Ankle Pump 6/20                                       Seated Heel Slide 6/20 5\"x20        Quad Set 6/20 5\"x15        Glute Set 6/20         Long Sit Calf Stretch 6/20                   SLR  Ind  20x 20x 20x      S/L Hip ABD          Bridge    5\"x20      LAQ   2#   20x                                     NEUROMUSCULAR REEDUCATION           SL Balance          Standing Hip Flex    15x ea 15x ea      Standing Hip ABD   15x ea 15x ea      Standing Hip Ext   15x ea 15x ea      Standing HS Curl                    TB TKE              15x ea      FWD Mini Lunge    10x ea      LAT Mini Lunge    10x      Mini Squat                    FWD Step Up          LAT Step Up                    THERAPEUTIC ACTIVITY                                                  GAIT TRAINING                                                  MODALITIES                                        "

## 2025-07-03 ENCOUNTER — OFFICE VISIT (OUTPATIENT)
Dept: PHYSICAL THERAPY | Facility: CLINIC | Age: 75
End: 2025-07-03
Attending: ORTHOPAEDIC SURGERY
Payer: MEDICARE

## 2025-07-03 DIAGNOSIS — M17.12 PRIMARY OSTEOARTHRITIS OF LEFT KNEE: Primary | ICD-10-CM

## 2025-07-03 DIAGNOSIS — Z96.652 S/P TKR (TOTAL KNEE REPLACEMENT), LEFT: ICD-10-CM

## 2025-07-03 PROCEDURE — 97110 THERAPEUTIC EXERCISES: CPT | Performed by: PHYSICAL THERAPIST

## 2025-07-03 PROCEDURE — 97112 NEUROMUSCULAR REEDUCATION: CPT | Performed by: PHYSICAL THERAPIST

## 2025-07-03 NOTE — PROGRESS NOTES
Daily Note     Today's date: 7/3/2025  Patient name: Charis Brizuela  : 1950  MRN: [id number]  Referring provider: Gavin Parks MD  Dx:   Encounter Diagnosis     ICD-10-CM    1. Primary osteoarthritis of left knee  M17.12       2. S/P TKR (total knee replacement), left  Z96.652                      Subjective:   Current Status: knee is feeling ok today - motion and pain are improving  New Symptoms/ Problems: NA  Response to Last Treatment: no adverse reaction to LV  HEP/ Activity Recommendations:  performing regularly   Progress Towards Goals: pain levels are improving as well as swelling     Objective: See treatment diary below      Assessment:   Stage: Chronic   Etiology: Degenerative  Stability of Symptoms:  At Evaluation: Stable - unchanged  Global Rating of Change:   Symptom Irritability Level: Moderate  Primary Movement System Diagnosis: Motor Control  Primary Pain Phenotype: Nociceptive  Patient Specific Functional Scale:   25: return to walking with minimal pain 0/10, return to regular exercise program 0/10, recover fully from surgery 0/10; Total 0/30   Patient Acceptable Symptom State: unacceptable  FOTO Prediction: 71 by visit 18  FOTO Progress: 36 at evaluation   Greatest Concern: recovering from surgery   Current Activity Plan: added to HEP ()  Current Educational Needs: appropriate activity modifications, appropriate activity progressions, realistic expectations, timeframe for recovery, next steps to take to progress towards goals    Patient had good tolerance to manual stretching - flexion is improving well.  She continues to make steady progress with WB tolerance.  Mini lunges and squats held today - did not feel up for it.   Skilled PT continues to be required to guide progression of knee mobility and strength to allow her to return to performing daily home tasks without assistance.       Plan: Continue with POC.  Monitor tolerance to last treatment and activity recommendations.   "Follow up with Shannan on 7/8/25.  Continue progressing knee mobility with regular stretching and swelling control.  Introduce balance and step up exercises if appropriate.          Daily Treatment Diary     DX: (L) Knee OA  Follow Up with Referring Provider:   Precautions: NA  CO-MORBIDITIES: Hx of AAA, (R) TKA (2023)    POC Expires Reeval for Medicare to be completed Unit Limit Auth Expiration Date PT/OT/STVisit Limit   9/12/25 By visit 10  NA 12/31/25 BOMN    Completed on visit                          TREATMENT DIARY  Auth Status DATE 6/24 6/27 7/1 7/3     NA Visit # 2 3 4 5      Remaining         MANUAL THERAPY         (L) Knee PROM - Seated Flex 8 min 8 min 8 min 8 min                                                  THERAPEUTIC EXERCISE HEP         Recumbent Bike  ROM  6 min ROM  6 min ROM  6 min ROM  6 min               Ankle Pump 6/20                                       Seated Heel Slide 6/20 5\"x20        Quad Set 6/20 5\"x15        Glute Set 6/20         Long Sit Calf Stretch 6/20                   SLR  Ind  20x 20x 20x 20x     S/L Hip ABD          Bridge    5\"x20 5\"x20     LAQ   2#   20x  2# 20x                                   NEUROMUSCULAR REEDUCATION           SL Balance          Standing Hip Flex    15x ea 15x ea 20x ea     Standing Hip ABD   15x ea 15x ea 20x ea     Standing Hip Ext   15x ea 15x ea 20x ea     Standing HS Curl                    TB TKE              15x ea      FWD Mini Lunge    10x ea      LAT Mini Lunge    10x      Mini Squat                    FWD Step Up          LAT Step Up                    THERAPEUTIC ACTIVITY                                                  GAIT TRAINING                                                  MODALITIES                                        "

## 2025-07-08 ENCOUNTER — OFFICE VISIT (OUTPATIENT)
Dept: PHYSICAL THERAPY | Facility: CLINIC | Age: 75
End: 2025-07-08
Attending: ORTHOPAEDIC SURGERY
Payer: MEDICARE

## 2025-07-08 DIAGNOSIS — Z96.652 S/P TKR (TOTAL KNEE REPLACEMENT), LEFT: ICD-10-CM

## 2025-07-08 DIAGNOSIS — M17.12 PRIMARY OSTEOARTHRITIS OF LEFT KNEE: Primary | ICD-10-CM

## 2025-07-08 PROCEDURE — 97110 THERAPEUTIC EXERCISES: CPT

## 2025-07-08 PROCEDURE — 97112 NEUROMUSCULAR REEDUCATION: CPT

## 2025-07-08 NOTE — PROGRESS NOTES
"Daily Note     Today's date: 2025  Patient name: Charis Brizuela  : 1950  MRN: [id number]  Referring provider: Gavin Parks MD  Dx:   Encounter Diagnosis     ICD-10-CM    1. Primary osteoarthritis of left knee  M17.12       2. S/P TKR (total knee replacement), left  Z96.652                      Subjective: Pt reports she uses CP most of the day on/off for pain relief.  States she feels this knee is more painful than her R TKR.  Reports good compliance w/ HEP.   C/o pain behind the knee at times.      Objective: See treatment diary below      Assessment: Tolerated treatment well.   Pt demonstrates good compliance /w HEP. Patient demonstrated fatigue post treatment and would benefit from continued PT for cont'd work on LE strengthening, knee ROM and gait.  Pt demonstrated safe use of cane and was told she could use that in the community unless going great distances.       Plan: Continue per plan of care.  Progress treatment as tolerated.       Daily Treatment Diary     DX: (L) Knee OA 25  Follow Up with Referring Provider:   Precautions: NA  CO-MORBIDITIES: Hx of AAA, (R) TKA ()    POC Expires Reeval for Medicare to be completed Unit Limit Auth Expiration Date PT/OT/STVisit Limit   25 By visit 10  NA 25 BOMN    Completed on visit                          TREATMENT DIARY  Auth Status DATE 6/24 6/27 7/1 7/3 7/8    NA Visit # 2 3 4 5 6     Remaining         MANUAL THERAPY         (L) Knee PROM - Seated Flex 8 min 8 min 8 min 8 min 8 min                                                 THERAPEUTIC EXERCISE HEP         Recumbent Bike  ROM  6 min ROM  6 min ROM  6 min ROM  6 min ROM  6 min              Ankle Pump                                        Seated Heel Slide  5\"x20        Quad Set  5\"x15        Glute Set          Long Sit Calf Stretch                    SLR  Ind  20x 20x 20x 20x 20x    S/L Hip ABD          Bridge    5\"x20 5\"x20 5\"x20    LAQ   2#   20x  2# 20x 2# " 20x                                  NEUROMUSCULAR REEDUCATION           SL Balance          Standing Hip Flex    15x ea 15x ea 20x ea 20x ea    Standing Hip ABD   15x ea 15x ea 20x ea 20x ea    Standing Hip Ext   15x ea 15x ea 20x ea 20x ea    Standing HS Curl      10x2              TB TKE              15x ea      FWD Mini Lunge    10x ea  10x ea    LAT Mini Lunge    10x  10x ea    Mini Squat      10x              FWD Step Up          LAT Step Up                    THERAPEUTIC ACTIVITY                                                  GAIT TRAINING          SPC      60' SPC                                  MODALITIES

## 2025-07-11 ENCOUNTER — OFFICE VISIT (OUTPATIENT)
Dept: PHYSICAL THERAPY | Facility: CLINIC | Age: 75
End: 2025-07-11
Attending: ORTHOPAEDIC SURGERY
Payer: MEDICARE

## 2025-07-11 DIAGNOSIS — Z96.652 S/P TKR (TOTAL KNEE REPLACEMENT), LEFT: ICD-10-CM

## 2025-07-11 DIAGNOSIS — M17.12 PRIMARY OSTEOARTHRITIS OF LEFT KNEE: Primary | ICD-10-CM

## 2025-07-11 PROCEDURE — 97110 THERAPEUTIC EXERCISES: CPT | Performed by: PHYSICAL THERAPIST

## 2025-07-11 PROCEDURE — 97112 NEUROMUSCULAR REEDUCATION: CPT | Performed by: PHYSICAL THERAPIST

## 2025-07-11 NOTE — PROGRESS NOTES
Daily Note     Today's date: 2025  Patient name: Charis Brizuela  : 1950  MRN: [id number]  Referring provider: Gavin Parks MD  Dx:   Encounter Diagnosis     ICD-10-CM    1. Primary osteoarthritis of left knee  M17.12       2. S/P TKR (total knee replacement), left  Z96.652                      Subjective:   Current Status: knee is feeling good overall - continues to have some swelling - has returned to driving without issues  New Symptoms/ Problems: NA  Response to Last Treatment: no adverse reaction to LV  HEP/ Activity Recommendations:  performing regularly   Progress Towards Goals: walking with SPC is going well, driving is going well    Objective: See treatment diary below      Assessment:   Stage: Chronic   Etiology: Degenerative  Stability of Symptoms:  At Evaluation: Stable - unchanged  Global Rating of Change:   Symptom Irritability Level: Moderate  Primary Movement System Diagnosis: Motor Control  Primary Pain Phenotype: Nociceptive  Patient Specific Functional Scale:   25: return to walking with minimal pain 0/10, return to regular exercise program 0/10, recover fully from surgery 0/10; Total 0/30   Patient Acceptable Symptom State: unacceptable  FOTO Prediction: 71 by visit 18  FOTO Progress: 36 at evaluation   Greatest Concern: recovering from surgery   Current Activity Plan: added to HEP ()  Current Educational Needs: appropriate activity modifications, appropriate activity progressions, realistic expectations, timeframe for recovery, next steps to take to progress towards goals    Patient had good tolerance to manual treatment - ROM is progressing well.  She had good form with balance progressions.  She tolerated progressions with ankle weights well.  She noted fatigue but less difficulty with lunges today.  She is making steady progress with PT.  Skilled PT continues to be required to guide progression of knee mobility and strength to allow her to return to performing daily home  "tasks without assistance.       Plan: Continue with POC.  Monitor tolerance to last treatment and activity recommendations.  Follow up with me on 7/15/25.  Continue progressing knee mobility with regular stretching and swelling control.  Introduce step up exercises if appropriate       Daily Treatment Diary     DX: (L) Knee OA 6/17/25  Follow Up with Referring Provider:   Precautions: NA  CO-MORBIDITIES: Hx of AAA, (R) TKA (2023)    POC Expires Reeval for Medicare to be completed Unit Limit Auth Expiration Date PT/OT/STVisit Limit   9/12/25 By visit 10  NA 12/31/25 BOMN    Completed on visit                          TREATMENT DIARY  Auth Status DATE 7/3 7/8 7/11      NA Visit # 5 6 7       Remaining         MANUAL THERAPY         (L) Knee PROM - Seated Flex 8 min 8 min 8 min                                                   THERAPEUTIC EXERCISE HEP         Recumbent Bike  ROM  6 min ROM  6 min ROM  6 min                Ankle Pump 6/20                                       Seated Heel Slide 6/20         Quad Set 6/20         Glute Set 6/20         Long Sit Calf Stretch 6/20                   SLR  20x 20x 20x      S/L Hip ABD          Bridge  5\"x20 5\"x20 5\"x20      LAQ  2# 20x 2# 20x 4# 20x                                    NEUROMUSCULAR REEDUCATION           SL Balance    30\"x3 ea      Standing Hip Flex   20x ea 20x ea 1.5#   20x ea      Standing Hip ABD  20x ea 20x ea 1.5#   20x ea      Standing Hip Ext  20x ea 20x ea 1.5#   20x ea      Standing HS Curl   10x2 1.5#   20x ea                TB TKE                    FWD Mini Lunge   10x ea 15x ea      LAT Mini Lunge   10x ea 15x ea      Mini Squat   10x 15x                FWD Step Up          LAT Step Up                    THERAPEUTIC ACTIVITY                                                  GAIT TRAINING          SPC   60' SPC                                     MODALITIES                                          "

## 2025-07-15 ENCOUNTER — APPOINTMENT (OUTPATIENT)
Dept: PHYSICAL THERAPY | Facility: CLINIC | Age: 75
End: 2025-07-15
Attending: ORTHOPAEDIC SURGERY
Payer: MEDICARE

## 2025-07-18 ENCOUNTER — APPOINTMENT (OUTPATIENT)
Dept: PHYSICAL THERAPY | Facility: CLINIC | Age: 75
End: 2025-07-18
Attending: ORTHOPAEDIC SURGERY
Payer: MEDICARE

## 2025-07-22 ENCOUNTER — EVALUATION (OUTPATIENT)
Dept: PHYSICAL THERAPY | Facility: CLINIC | Age: 75
End: 2025-07-22
Attending: ORTHOPAEDIC SURGERY
Payer: MEDICARE

## 2025-07-22 DIAGNOSIS — M17.12 PRIMARY OSTEOARTHRITIS OF LEFT KNEE: Primary | ICD-10-CM

## 2025-07-22 DIAGNOSIS — Z96.652 S/P TKR (TOTAL KNEE REPLACEMENT), LEFT: ICD-10-CM

## 2025-07-22 PROCEDURE — 97110 THERAPEUTIC EXERCISES: CPT | Performed by: PHYSICAL THERAPIST

## 2025-07-22 PROCEDURE — 97112 NEUROMUSCULAR REEDUCATION: CPT | Performed by: PHYSICAL THERAPIST

## 2025-07-22 NOTE — PROGRESS NOTES
PT Re-Evaluation     Today's date: 2025  Patient name: Charis Brizuela  : 1950  MRN: [id number]  Referring provider: Gavin Parks MD  Dx:   Encounter Diagnosis     ICD-10-CM    1. Primary osteoarthritis of left knee  M17.12       2. S/P TKR (total knee replacement), left  Z96.652                      Assessment  Impairments: abnormal gait, activity intolerance, impaired balance, impaired physical strength, lacks appropriate home exercise program and pain with function  Symptom irritability: moderate    Assessment details: Patient has attended 8 PT treatment sessions from 25 to 25.  Since initial evaluation patient displays with objective improvements with pain levels, strength, ROM, and function.  Patient has achieved a 5 point increase on the Global Rating of Change scale.  Patient demonstrates a 14 point improvement on the Patient Specific Functional Scale and a 34 point improvement on FOTO indicating excellent progress towards her goals.  She continues to have some strength and endurance deficits which limit her functionally.  At this time it is recommended that she continue with skilled PT to guide progression of strength and endurance to allow her to return to a regular exercise program.      Please contact me if you have any questions ([phone]). Thank you for the opportunity to share in the care of this patient.      Understanding of Dx/Px/POC: good     Prognosis: good  Prognosis details: Positive prognostic indicators include positive attitude toward recovery, motivated to improve, good understanding of condition, and realistic expectations.      Goals  STG to be met in 6 weeks:  - Increase (L) Knee AROM: 0-90 degrees. - met  - Increase (B) Hip and Knee strength by 1/2 MMT grade. - met  - Improve SL balance to 15 seconds. - met  - I with HEP. - met  - Patient will be able to return to walking without AD. - met  LTG to be met in 12 weeks:  - Increase (L) Knee AROM: 0-120 degrees.  -  Increase (B) Hip and Knee strength to 5/5 all planes.  - Improve SL balance to 30 seconds.   - I with updated HEP.  - Patient will be able to return to regular exercise program.         Plan  Patient would benefit from: skilled physical therapy  Planned modality interventions: cryotherapy and thermotherapy: hydrocollator packs    Planned therapy interventions: joint mobilization, manual therapy, neuromuscular re-education, patient education, strengthening, stretching, therapeutic activities, therapeutic exercise, home exercise program, body mechanics training, activity modification, functional ROM exercises, gait training and balance    Plan of Care beginning date: 6/20/2025  Plan of Care expiration date: 9/12/2025  Treatment plan discussed with: patient  Plan details: Prognosis above is given PT services 2x/week tapering to 1x/week over the next 12 weeks and home program adherence.           Subjective Evaluation    History of Present Illness  Date of surgery: 6/17/2025  Mechanism of injury: surgery  Mechanism of injury: At Evaluation (April 24, 2025): Patient reports chronic (L) Knee pain for many years.  Over the years she has managed her symptoms with activity modification, medication, injections, and PT.  Her pain has worsened.  She is scheduled for (L) TKA for 6/17/25.    Red Flag Screen: Patient denies: fever, changes in bowel or bladder function, headache, dizziness, visual changes, nausea, vomiting, weakness, unexplained weight loss, numbness, tingling , pain with coughing/ sneezing, or traumatic SYLWIA.     Greatest concern:  recovering from surgery    (6/20/25): Patient underwent (L) TKA on 6/17/25.  She was discharged to home the same day.     (7/22/25): Patient reports since starting PT after TKA she sees significant improvement in pain levels, ROM, strength, and function.  Global Rating of Change: +5 (quite a bit better).  She is walking with a SPC with good overall tolerance.  She is starting to get back  to a regular exercise program.  She is struggling with getting off a low soft couch.        Quality of life: good    Patient Goals  Patient goal: Patient Specific Functional Scale: return to walking with minimal pain /10, return to regular exercise program 10, recover fully from surgery 10; Total 14/30  Pain  Current pain ratin  At best pain ratin  At worst pain ratin  Location: (L) Knee  Quality: dull ache and sharp  Alleviating factors: Activity modification, essential oils, ice.  Exacerbated by: Standing for extended time, walking, sit to stand, stair negotiation.    Social Support  Steps to enter house: yes  Stairs in house: yes   Lives in: multiple-level home  Lives with: spouse    Employment status: not working    Diagnostic Tests  X-ray: abnormal  Treatments  Previous treatment: injection treatment, medication and physical therapy        Objective     Static Posture   General Observations  Symmetrical weight bearing.     Observations     Additional Observation Details  Incision clean and healing well - no evidence of infection     Palpation     Additional Palpation Details  TTP throughout (L) knee    Active Range of Motion   Left Knee   Flexion: 115 degrees with pain  Extension: 0 degrees     Right Knee   Flexion: 120 degrees   Extension: 0 degrees     Strength/Myotome Testing     Left Hip   Planes of Motion   Extension: 4+  Abduction: 4    Right Hip   Planes of Motion   Extension: 4+  Abduction: 4    Left Knee   Flexion: 4-  Extension: 4+    Right Knee   Flexion: 5  Extension: 5    Ambulation     Observational Gait   Walking speed and left stance time within functional limits. Decreased left step length.     Additional Observational Gait Details  SPC in (R) hand    Functional Assessment        Comments  Timed Up and Go without AD = 10.2 seconds              Daily Treatment Diary     DX: (L) Knee OA  Follow Up with Referring Provider:   Precautions: NA  CO-MORBIDITIES: Hx of AAA, (R) TKA  "(2023)    POC Expires Reeval for Medicare to be completed Unit Limit Auth Expiration Date PT/OT/STVisit Limit   9/12/25 By visit 18  NA 12/31/25 BOMN    Completed on visit 8                 Stage: Chronic   Etiology: Degenerative  Stability of Symptoms:  At Evaluation: Stable - unchanged  Global Rating of Change: +5  Symptom Irritability Level: Moderate  Primary Movement System Diagnosis: Motor Control  Primary Pain Phenotype: Nociceptive  Patient Specific Functional Scale:   4/24/25: return to walking with minimal pain 0/10, return to regular exercise program 0/10, recover fully from surgery 0/10; Total 0/30 7/22/25: return to walking with minimal pain 5/10, return to regular exercise program 4/10, recover fully from surgery 5/10; Total 14/30   Patient Acceptable Symptom State: unacceptable  FOTO Prediction: 71 by visit 18  FOTO Progress: 36 at evaluation; 7/22/25: 70   Greatest Concern: recovering from surgery   Current Activity Plan: added to HEP (4/24)  Current Educational Needs: appropriate activity modifications, appropriate activity progressions, realistic expectations, timeframe for recovery, next steps to take to progress towards goals      TREATMENT DIARY  Auth Status DATE 7/3 7/8 7/11 7/22     NA Visit # 5 6 7 8      Remaining         MANUAL THERAPY         (L) Knee PROM - Seated Flex 8 min 8 min 8 min 8 min                                                  THERAPEUTIC EXERCISE HEP         Recumbent Bike  ROM  6 min ROM  6 min ROM  6 min 6 min               Ankle Pump 6/20                                       Seated Heel Slide 6/20         Quad Set 6/20         Glute Set 6/20         Long Sit Calf Stretch 6/20                   SLR  20x 20x 20x      S/L Hip ABD          Bridge  5\"x20 5\"x20 5\"x20      LAQ  2# 20x 2# 20x 4# 20x                                    NEUROMUSCULAR REEDUCATION           SL Balance    30\"x3 ea 30\"x3 ea     Standing Hip Flex   20x ea 20x ea 1.5#   20x ea      Standing Hip ABD  " "20x ea 20x ea 1.5#   20x ea      Standing Hip Ext  20x ea 20x ea 1.5#   20x ea      Standing HS Curl   10x2 1.5#   20x ea                TB TKE                    FWD Mini Lunge   10x ea 15x ea      LAT Mini Lunge   10x ea 15x ea      Mini Squat   10x 15x 20x               FWD Step Up     6\" Step  20x     LAT Step Up     6\" Step  20x               THERAPEUTIC ACTIVITY                                                  GAIT TRAINING          SPC   60' SPC                                     MODALITIES                                          "

## 2025-07-25 ENCOUNTER — OFFICE VISIT (OUTPATIENT)
Dept: PHYSICAL THERAPY | Facility: CLINIC | Age: 75
End: 2025-07-25
Attending: ORTHOPAEDIC SURGERY
Payer: MEDICARE

## 2025-07-25 DIAGNOSIS — M17.12 PRIMARY OSTEOARTHRITIS OF LEFT KNEE: Primary | ICD-10-CM

## 2025-07-25 DIAGNOSIS — Z96.652 S/P TKR (TOTAL KNEE REPLACEMENT), LEFT: ICD-10-CM

## 2025-07-25 PROCEDURE — 97112 NEUROMUSCULAR REEDUCATION: CPT | Performed by: PHYSICAL THERAPIST

## 2025-07-25 PROCEDURE — 97110 THERAPEUTIC EXERCISES: CPT | Performed by: PHYSICAL THERAPIST

## 2025-07-25 NOTE — PROGRESS NOTES
Daily Note     Today's date: 2025  Patient name: Charis Brizuela  : 1950  MRN: [id number]  Referring provider: Gavin Parks MD  Dx:   Encounter Diagnosis     ICD-10-CM    1. Primary osteoarthritis of left knee  M17.12       2. S/P TKR (total knee replacement), left  Z96.652                      Subjective:   Current Status: feeling good overall today - knee continues to be stiff at times  New Symptoms/ Problems: NA  Response to Last Treatment: no adverse reaction to LV  HEP/ Activity Recommendations:  performing regularly  Progress Towards Goals: walking continues to get easier - only taking cane for community ambulation    Objective: See treatment diary below      Assessment: Patient continues to respond well to manual stretching.  She noted feeling challenged with step exercise progression - added step over step stair negotiation to home recommendations.  She had no increased pain with additional exercises.  Skilled PT continues to be required to guide progression of strength and endurance to allow her to return to a regular exercise program.       Plan: Continue with POC.  Monitor tolerance to last treatment and activity recommendations.  Follow up with me on 25.  Continue progressing ROM with manual stretching and home stretching as well as strength with progressive exercise         Daily Treatment Diary     DX: (L) Knee OA  Follow Up with Referring Provider:   Precautions: NA  CO-MORBIDITIES: Hx of AAA, (R) TKA ()    POC Expires Reeval for Medicare to be completed Unit Limit Auth Expiration Date PT/OT/STVisit Limit   25 By visit 18  NA 25 BOMN    Completed on visit 8                 Stage: Chronic   Etiology: Degenerative  Stability of Symptoms:  At Evaluation: Stable - unchanged  Global Rating of Change: +5  Symptom Irritability Level: Moderate  Primary Movement System Diagnosis: Motor Control  Primary Pain Phenotype: Nociceptive  Patient Specific Functional Scale:   25:  "return to walking with minimal pain 0/10, return to regular exercise program 0/10, recover fully from surgery 0/10; Total 0/30 7/22/25: return to walking with minimal pain 5/10, return to regular exercise program 4/10, recover fully from surgery 5/10; Total 14/30   Patient Acceptable Symptom State: unacceptable  FOTO Prediction: 71 by visit 18  FOTO Progress: 36 at evaluation; 7/22/25: 70   Greatest Concern: recovering from surgery   Current Activity Plan: added to HEP (4/24); perform step ups step over step pattern (7/25)  Current Educational Needs: appropriate activity modifications, appropriate activity progressions, realistic expectations, timeframe for recovery, next steps to take to progress towards goals      TREATMENT DIARY  Auth Status DATE 7/11 7/22 7/25      NA Visit # 7 8 9       Remaining         MANUAL THERAPY         (L) Knee PROM - Seated Flex 8 min 8 min 8 min                                                   THERAPEUTIC EXERCISE HEP         Recumbent Bike  ROM  6 min 6 min 6 min                Ankle Pump 6/20                                       Seated Heel Slide 6/20         Quad Set 6/20         Glute Set 6/20         Long Sit Calf Stretch 6/20                   SLR 7/25 20x  1.5# 20x ea      S/L Hip ABD 7/25   1.5# 20x ea      Bridge 7/25 5\"x20  5\"x20      LAQ  4# 20x  5# 20x                                    NEUROMUSCULAR REEDUCATION           SL Balance  30\"x3 ea 30\"x3 ea       Standing Hip Flex   1.5#   20x ea        Standing Hip ABD  1.5#   20x ea        Standing Hip Ext  1.5#   20x ea        Standing HS Curl  1.5#   20x ea                  TB TKE                    FWD Mini Lunge 7/25 15x ea  15x ea      LAT Mini Lunge 7/25 15x ea  15x ea      Mini Squat 7/25 15x 20x 20x                FWD Step Up   6\" Step  20x 8\" Step  20x      LAT Step Up   6\" Step  20x 8\" Step  20x                THERAPEUTIC ACTIVITY                                                  GAIT TRAINING          SPC        "                                 MODALITIES

## 2025-07-25 NOTE — HOME EXERCISE EDUCATION
Program_ID:[id number]   Access Code: [email or web address]  URL: [email or web address]  Date: 07-  Prepared By: Christopher Del Castillo    Program Notes      Exercises      - Supine Active Straight Leg Raise - 1 x daily -  x weekly - 2 sets - 10 reps      - Sidelying Hip Abduction - 1 x daily -  x weekly - 2 sets - 10 reps      - Supine Bridge - 1 x daily -  x weekly - 2 sets - 10 reps - 5 second hold      - Mini Lunge - 1 x daily -  x weekly - 2 sets - 10 reps      - Side Lunge with Counter Support - 1 x daily -  x weekly - 2 sets - 10 reps      - Mini Squat - 1 x daily -  x weekly - 2 sets - 10 reps    Patient Education      - Clinic Information       - Your Next Appointment      - Understanding Pain

## 2025-07-29 ENCOUNTER — OFFICE VISIT (OUTPATIENT)
Dept: PHYSICAL THERAPY | Facility: CLINIC | Age: 75
End: 2025-07-29
Attending: ORTHOPAEDIC SURGERY
Payer: MEDICARE

## 2025-07-29 DIAGNOSIS — Z96.652 S/P TKR (TOTAL KNEE REPLACEMENT), LEFT: ICD-10-CM

## 2025-07-29 DIAGNOSIS — M17.12 PRIMARY OSTEOARTHRITIS OF LEFT KNEE: Primary | ICD-10-CM

## 2025-07-29 PROCEDURE — 97110 THERAPEUTIC EXERCISES: CPT | Performed by: PHYSICAL THERAPIST

## 2025-07-29 PROCEDURE — 97112 NEUROMUSCULAR REEDUCATION: CPT | Performed by: PHYSICAL THERAPIST

## 2025-08-01 ENCOUNTER — OFFICE VISIT (OUTPATIENT)
Dept: PHYSICAL THERAPY | Facility: CLINIC | Age: 75
End: 2025-08-01
Attending: ORTHOPAEDIC SURGERY
Payer: MEDICARE

## 2025-08-01 DIAGNOSIS — M17.12 PRIMARY OSTEOARTHRITIS OF LEFT KNEE: Primary | ICD-10-CM

## 2025-08-01 DIAGNOSIS — Z96.652 S/P TKR (TOTAL KNEE REPLACEMENT), LEFT: ICD-10-CM

## 2025-08-01 PROCEDURE — 97110 THERAPEUTIC EXERCISES: CPT

## 2025-08-01 PROCEDURE — 97112 NEUROMUSCULAR REEDUCATION: CPT

## 2025-08-05 ENCOUNTER — OFFICE VISIT (OUTPATIENT)
Dept: PHYSICAL THERAPY | Facility: CLINIC | Age: 75
End: 2025-08-05
Attending: ORTHOPAEDIC SURGERY
Payer: MEDICARE

## 2025-08-05 DIAGNOSIS — M17.12 PRIMARY OSTEOARTHRITIS OF LEFT KNEE: Primary | ICD-10-CM

## 2025-08-05 DIAGNOSIS — Z96.652 S/P TKR (TOTAL KNEE REPLACEMENT), LEFT: ICD-10-CM

## 2025-08-05 PROCEDURE — 97112 NEUROMUSCULAR REEDUCATION: CPT | Performed by: PHYSICAL THERAPIST

## 2025-08-05 PROCEDURE — 97110 THERAPEUTIC EXERCISES: CPT | Performed by: PHYSICAL THERAPIST

## 2025-08-06 PROBLEM — F41.9 ANXIETY DISORDER, UNSPECIFIED: Status: ACTIVE | Noted: 2025-08-06

## 2025-08-06 PROBLEM — N63.0 MASS OF BREAST: Status: ACTIVE | Noted: 2025-08-06

## 2025-08-06 PROBLEM — E83.110 HEREDITARY HEMOCHROMATOSIS (HCC): Status: ACTIVE | Noted: 2025-08-06

## 2025-08-06 PROBLEM — M48.061 SPINAL STENOSIS, LUMBAR REGION WITHOUT NEUROGENIC CLAUDICATION: Status: ACTIVE | Noted: 2025-08-06

## 2025-08-06 PROBLEM — E78.00 PURE HYPERCHOLESTEROLEMIA, UNSPECIFIED: Status: ACTIVE | Noted: 2025-08-06

## 2025-08-06 PROBLEM — I71.40 ABDOMINAL AORTIC ANEURYSM (AAA) (HCC): Status: ACTIVE | Noted: 2025-08-06

## 2025-08-06 PROBLEM — H40.9 UNSPECIFIED GLAUCOMA: Status: ACTIVE | Noted: 2025-08-06

## 2025-08-06 PROBLEM — K57.90 DIVERTICULAR DISEASE: Status: ACTIVE | Noted: 2025-08-06

## 2025-08-06 PROBLEM — G43.909 MIGRAINE, UNSPECIFIED, NOT INTRACTABLE, WITHOUT STATUS MIGRAINOSUS: Status: ACTIVE | Noted: 2025-08-06

## 2025-08-12 ENCOUNTER — OFFICE VISIT (OUTPATIENT)
Dept: PHYSICAL THERAPY | Facility: CLINIC | Age: 75
End: 2025-08-12
Attending: ORTHOPAEDIC SURGERY
Payer: MEDICARE

## 2025-08-15 ENCOUNTER — OFFICE VISIT (OUTPATIENT)
Dept: PHYSICAL THERAPY | Facility: CLINIC | Age: 75
End: 2025-08-15
Attending: ORTHOPAEDIC SURGERY
Payer: MEDICARE